# Patient Record
Sex: FEMALE | Race: WHITE | NOT HISPANIC OR LATINO | URBAN - METROPOLITAN AREA
[De-identification: names, ages, dates, MRNs, and addresses within clinical notes are randomized per-mention and may not be internally consistent; named-entity substitution may affect disease eponyms.]

---

## 2017-01-18 ENCOUNTER — EMERGENCY (EMERGENCY)
Facility: HOSPITAL | Age: 77
LOS: 1 days | Discharge: HOME | End: 2017-01-18
Attending: EMERGENCY MEDICINE | Admitting: EMERGENCY MEDICINE
Payer: MEDICARE

## 2017-01-18 VITALS
OXYGEN SATURATION: 99 % | TEMPERATURE: 99 F | SYSTOLIC BLOOD PRESSURE: 178 MMHG | HEART RATE: 68 BPM | RESPIRATION RATE: 18 BRPM | DIASTOLIC BLOOD PRESSURE: 96 MMHG

## 2017-01-18 DIAGNOSIS — S90.32XA CONTUSION OF LEFT FOOT, INITIAL ENCOUNTER: ICD-10-CM

## 2017-01-18 DIAGNOSIS — Z88.1 ALLERGY STATUS TO OTHER ANTIBIOTIC AGENTS STATUS: ICD-10-CM

## 2017-01-18 DIAGNOSIS — M25.572 PAIN IN LEFT ANKLE AND JOINTS OF LEFT FOOT: ICD-10-CM

## 2017-01-18 PROCEDURE — 73610 X-RAY EXAM OF ANKLE: CPT | Mod: 26,LT

## 2017-01-18 PROCEDURE — 73630 X-RAY EXAM OF FOOT: CPT | Mod: 26,LT

## 2017-01-18 PROCEDURE — 73600 X-RAY EXAM OF ANKLE: CPT | Mod: 26,LT

## 2017-01-18 PROCEDURE — 73620 X-RAY EXAM OF FOOT: CPT | Mod: 26,LT

## 2017-01-18 PROCEDURE — 99283 EMERGENCY DEPT VISIT LOW MDM: CPT | Mod: 25

## 2017-01-18 RX ORDER — IBUPROFEN 200 MG
1 TABLET ORAL
Qty: 30 | Refills: 0 | OUTPATIENT
Start: 2017-01-18 | End: 2017-01-28

## 2017-01-18 RX ORDER — IBUPROFEN 200 MG
600 TABLET ORAL ONCE
Qty: 0 | Refills: 0 | Status: COMPLETED | OUTPATIENT
Start: 2017-01-18 | End: 2017-01-18

## 2017-01-18 RX ADMIN — Medication 600 MILLIGRAM(S): at 19:14

## 2017-01-18 RX ADMIN — Medication 600 MILLIGRAM(S): at 19:19

## 2017-01-18 NOTE — ED PROVIDER NOTE - MUSCULOSKELETAL, MLM
Ecchymosis to left midfoot. Left midfoot TTP. Lateral malleolus TTP. Distal pulses intact, sensation intact. Pt is ambulatory. LLE: ecchymosis to left midfoot with midfoot TTP. Lateral malleolus TTP. Distal pulses intact, sensation intact. Pt is ambulatory.

## 2017-01-18 NOTE — ED PROVIDER NOTE - PROGRESS NOTE DETAILS
The scribe's documentation has been prepared under my direction and personally reviewed by me in its entirety. I confirm that the note above accurately reflects all work, treatment, procedures, and medical decision making performed by me.  CONNOR Callahan fb plantar aspect to sole of foot seen on xray - no visible or palpable fb on exam - probable old fb, will rx augmentin prophlaxis antibiotics, f/u podiatry

## 2017-01-18 NOTE — ED PROVIDER NOTE - NS ED MD SCRIBE ATTENDING SCRIBE SECTIONS
HISTORY OF PRESENT ILLNESS/VITAL SIGNS( Pullset)/HIV/INTAKE ASSESSMENT/SCREENINGS/PAST MEDICAL/SURGICAL/SOCIAL HISTORY/REVIEW OF SYSTEMS/PHYSICAL EXAM

## 2017-01-18 NOTE — ED PROVIDER NOTE - DIAGNOSTIC INTERPRETATION
x-ray left ankle, 2 views: no fractures, no dislocations, no subluxation  x-ray left foot AP + lateral + oblique: no fractures, no dislocations, probable old foreign body in soft tissue on sole of foot

## 2017-06-20 ENCOUNTER — EMERGENCY (EMERGENCY)
Facility: HOSPITAL | Age: 77
LOS: 1 days | Discharge: HOME | End: 2017-06-20
Admitting: EMERGENCY MEDICINE
Payer: MEDICARE

## 2017-06-20 VITALS
TEMPERATURE: 99 F | HEART RATE: 70 BPM | DIASTOLIC BLOOD PRESSURE: 84 MMHG | OXYGEN SATURATION: 96 % | RESPIRATION RATE: 20 BRPM | SYSTOLIC BLOOD PRESSURE: 138 MMHG

## 2017-06-20 VITALS — HEART RATE: 77 BPM | SYSTOLIC BLOOD PRESSURE: 141 MMHG | DIASTOLIC BLOOD PRESSURE: 88 MMHG

## 2017-06-20 DIAGNOSIS — Z88.1 ALLERGY STATUS TO OTHER ANTIBIOTIC AGENTS STATUS: ICD-10-CM

## 2017-06-20 DIAGNOSIS — M54.5 LOW BACK PAIN: ICD-10-CM

## 2017-06-20 DIAGNOSIS — S30.0XXA CONTUSION OF LOWER BACK AND PELVIS, INITIAL ENCOUNTER: ICD-10-CM

## 2017-06-20 PROCEDURE — 99284 EMERGENCY DEPT VISIT MOD MDM: CPT

## 2017-06-20 PROCEDURE — 72131 CT LUMBAR SPINE W/O DYE: CPT | Mod: 26

## 2017-06-20 PROCEDURE — 72128 CT CHEST SPINE W/O DYE: CPT | Mod: 26

## 2017-06-20 RX ORDER — OXYCODONE HYDROCHLORIDE 5 MG/1
1 TABLET ORAL
Qty: 8 | Refills: 0 | OUTPATIENT
Start: 2017-06-20 | End: 2017-06-22

## 2017-06-20 NOTE — ED ADULT NURSE NOTE - OBJECTIVE STATEMENT
Patient to ED with complaint of right sided back and leg pain s/p fall.  Patient denies hitting head.  No distress

## 2017-06-20 NOTE — ED PROVIDER NOTE - MEDICAL DECISION MAKING DETAILS
patient PMHx spine surgery with mid and lower back pain after trip and fall today, will do CT give analgesics and reassess

## 2017-06-20 NOTE — ED PROVIDER NOTE - PHYSICAL EXAMINATION
surgical scar over cervical and lumbar spines, TTP over T12-L2, no step off, no ecchymosis, no abrasions, FROM

## 2017-06-20 NOTE — ED PROVIDER NOTE - OBJECTIVE STATEMENT
PMHX cervical and lumbar spine surgery 30 years ago, anxiety presents with R sided mid and lower back pain after tripping and landing on her mid and lower back against the canister of a vaacuum. patient states pain radiated up to R shoulder and lower leg. denies paresthesias focal weakness, able to partial weight bear. denies head injury LOC, neck pain, chest or abdominal pain. patient took a xanax as she was feeling that she was about to have an anxiety attack.

## 2017-12-18 PROBLEM — Z00.00 ENCOUNTER FOR PREVENTIVE HEALTH EXAMINATION: Status: ACTIVE | Noted: 2017-12-18

## 2017-12-19 ENCOUNTER — APPOINTMENT (OUTPATIENT)
Dept: VASCULAR SURGERY | Facility: CLINIC | Age: 77
End: 2017-12-19
Payer: MEDICARE

## 2017-12-19 VITALS
HEART RATE: 70 BPM | HEIGHT: 61 IN | DIASTOLIC BLOOD PRESSURE: 72 MMHG | WEIGHT: 179.13 LBS | OXYGEN SATURATION: 96 % | TEMPERATURE: 98.1 F | SYSTOLIC BLOOD PRESSURE: 138 MMHG | BODY MASS INDEX: 33.82 KG/M2

## 2017-12-19 DIAGNOSIS — Z86.59 PERSONAL HISTORY OF OTHER MENTAL AND BEHAVIORAL DISORDERS: ICD-10-CM

## 2017-12-19 DIAGNOSIS — Z86.69 PERSONAL HISTORY OF OTHER DISEASES OF THE NERVOUS SYSTEM AND SENSE ORGANS: ICD-10-CM

## 2017-12-19 DIAGNOSIS — Z86.39 PERSONAL HISTORY OF OTHER ENDOCRINE, NUTRITIONAL AND METABOLIC DISEASE: ICD-10-CM

## 2017-12-19 DIAGNOSIS — S42.301A UNSPECIFIED FRACTURE OF SHAFT OF HUMERUS, RIGHT ARM, INITIAL ENCOUNTER FOR CLOSED FRACTURE: ICD-10-CM

## 2017-12-19 DIAGNOSIS — Z86.79 PERSONAL HISTORY OF OTHER DISEASES OF THE CIRCULATORY SYSTEM: ICD-10-CM

## 2017-12-19 DIAGNOSIS — R73.03 PREDIABETES.: ICD-10-CM

## 2017-12-19 DIAGNOSIS — Z78.9 OTHER SPECIFIED HEALTH STATUS: ICD-10-CM

## 2017-12-19 DIAGNOSIS — S82.91XA UNSPECIFIED FRACTURE OF RIGHT LOWER LEG, INITIAL ENCOUNTER FOR CLOSED FRACTURE: ICD-10-CM

## 2017-12-19 PROCEDURE — 93970 EXTREMITY STUDY: CPT

## 2017-12-19 PROCEDURE — 99215 OFFICE O/P EST HI 40 MIN: CPT | Mod: 25

## 2017-12-19 RX ORDER — ALPRAZOLAM 1 MG/1
1 TABLET ORAL
Refills: 0 | Status: ACTIVE | COMMUNITY

## 2017-12-19 RX ORDER — LATANOPROST/PF 0.005 %
0.01 DROPS OPHTHALMIC (EYE)
Refills: 0 | Status: ACTIVE | COMMUNITY

## 2017-12-19 RX ORDER — VALSARTAN AND HYDROCHLOROTHIAZIDE 80; 12.5 MG/1; MG/1
80-12.5 TABLET, FILM COATED ORAL
Refills: 0 | Status: ACTIVE | COMMUNITY

## 2017-12-19 RX ORDER — AMLODIPINE BESYLATE 2.5 MG/1
2.5 TABLET ORAL
Refills: 0 | Status: ACTIVE | COMMUNITY

## 2017-12-19 RX ORDER — ATORVASTATIN CALCIUM 40 MG/1
40 TABLET, FILM COATED ORAL
Refills: 0 | Status: ACTIVE | COMMUNITY

## 2017-12-19 RX ORDER — METHYLPHENIDATE HYDROCHLORIDE 10 MG/1
10 TABLET ORAL
Refills: 0 | Status: ACTIVE | COMMUNITY

## 2018-01-11 ENCOUNTER — APPOINTMENT (OUTPATIENT)
Dept: VASCULAR SURGERY | Facility: CLINIC | Age: 78
End: 2018-01-11

## 2018-01-11 ENCOUNTER — APPOINTMENT (OUTPATIENT)
Dept: VASCULAR SURGERY | Facility: CLINIC | Age: 78
End: 2018-01-11
Payer: MEDICARE

## 2018-01-11 VITALS — HEART RATE: 60 BPM | SYSTOLIC BLOOD PRESSURE: 151 MMHG | DIASTOLIC BLOOD PRESSURE: 83 MMHG

## 2018-01-11 VITALS
SYSTOLIC BLOOD PRESSURE: 160 MMHG | HEART RATE: 67 BPM | DIASTOLIC BLOOD PRESSURE: 88 MMHG | OXYGEN SATURATION: 96 % | TEMPERATURE: 98.3 F

## 2018-01-11 PROCEDURE — 36478 ENDOVENOUS LASER 1ST VEIN: CPT | Mod: LT

## 2018-01-18 ENCOUNTER — APPOINTMENT (OUTPATIENT)
Dept: VASCULAR SURGERY | Facility: CLINIC | Age: 78
End: 2018-01-18
Payer: MEDICARE

## 2018-01-18 VITALS
TEMPERATURE: 98.4 F | HEART RATE: 60 BPM | SYSTOLIC BLOOD PRESSURE: 161 MMHG | DIASTOLIC BLOOD PRESSURE: 94 MMHG | OXYGEN SATURATION: 98 %

## 2018-01-18 DIAGNOSIS — M79.605 PAIN IN LEFT LEG: ICD-10-CM

## 2018-01-18 DIAGNOSIS — R25.2 CRAMP AND SPASM: ICD-10-CM

## 2018-01-18 PROCEDURE — 37765 STAB PHLEB VEINS XTR 10-20: CPT | Mod: 52,RT

## 2018-01-20 ENCOUNTER — EMERGENCY (EMERGENCY)
Facility: HOSPITAL | Age: 78
LOS: 1 days | Discharge: HOME | End: 2018-01-20
Attending: EMERGENCY MEDICINE | Admitting: EMERGENCY MEDICINE
Payer: MEDICARE

## 2018-01-20 VITALS
RESPIRATION RATE: 18 BRPM | WEIGHT: 175.05 LBS | DIASTOLIC BLOOD PRESSURE: 91 MMHG | TEMPERATURE: 98 F | OXYGEN SATURATION: 99 % | HEART RATE: 71 BPM | SYSTOLIC BLOOD PRESSURE: 164 MMHG

## 2018-01-20 VITALS
OXYGEN SATURATION: 98 % | DIASTOLIC BLOOD PRESSURE: 61 MMHG | SYSTOLIC BLOOD PRESSURE: 144 MMHG | HEART RATE: 76 BPM | RESPIRATION RATE: 16 BRPM

## 2018-01-20 DIAGNOSIS — M79.89 OTHER SPECIFIED SOFT TISSUE DISORDERS: ICD-10-CM

## 2018-01-20 DIAGNOSIS — Z98.890 OTHER SPECIFIED POSTPROCEDURAL STATES: Chronic | ICD-10-CM

## 2018-01-20 DIAGNOSIS — E78.5 HYPERLIPIDEMIA, UNSPECIFIED: ICD-10-CM

## 2018-01-20 DIAGNOSIS — Z79.891 LONG TERM (CURRENT) USE OF OPIATE ANALGESIC: ICD-10-CM

## 2018-01-20 DIAGNOSIS — Z88.1 ALLERGY STATUS TO OTHER ANTIBIOTIC AGENTS STATUS: ICD-10-CM

## 2018-01-20 DIAGNOSIS — I10 ESSENTIAL (PRIMARY) HYPERTENSION: ICD-10-CM

## 2018-01-20 LAB
ALBUMIN SERPL ELPH-MCNC: 3.5 G/DL — SIGNIFICANT CHANGE UP (ref 3.4–5)
ALP SERPL-CCNC: 114 U/L — SIGNIFICANT CHANGE UP (ref 40–120)
ALT FLD-CCNC: 24 U/L — SIGNIFICANT CHANGE UP (ref 12–42)
ANION GAP SERPL CALC-SCNC: 7 MMOL/L — LOW (ref 9–16)
APTT BLD: 37.5 SEC — HIGH (ref 27.5–36.5)
AST SERPL-CCNC: 19 U/L — SIGNIFICANT CHANGE UP (ref 15–37)
BASOPHILS NFR BLD AUTO: 0.4 % — SIGNIFICANT CHANGE UP (ref 0–2)
BILIRUB SERPL-MCNC: 0.3 MG/DL — SIGNIFICANT CHANGE UP (ref 0.2–1.2)
BUN SERPL-MCNC: 25 MG/DL — HIGH (ref 7–23)
CALCIUM SERPL-MCNC: 9.1 MG/DL — SIGNIFICANT CHANGE UP (ref 8.5–10.5)
CHLORIDE SERPL-SCNC: 107 MMOL/L — SIGNIFICANT CHANGE UP (ref 96–108)
CO2 SERPL-SCNC: 31 MMOL/L — SIGNIFICANT CHANGE UP (ref 22–31)
CREAT SERPL-MCNC: 0.79 MG/DL — SIGNIFICANT CHANGE UP (ref 0.5–1.3)
D DIMER BLD IA.RAPID-MCNC: 279 NG/ML DDU — HIGH
EOSINOPHIL NFR BLD AUTO: 5.7 % — SIGNIFICANT CHANGE UP (ref 0–6)
GLUCOSE SERPL-MCNC: 111 MG/DL — HIGH (ref 70–99)
HCT VFR BLD CALC: 36.3 % — SIGNIFICANT CHANGE UP (ref 34.5–45)
HGB BLD-MCNC: 12 G/DL — SIGNIFICANT CHANGE UP (ref 11.5–15.5)
IMM GRANULOCYTES NFR BLD AUTO: 0.2 % — SIGNIFICANT CHANGE UP (ref 0–1.5)
INR BLD: 1 — SIGNIFICANT CHANGE UP (ref 0.88–1.16)
LYMPHOCYTES # BLD AUTO: 35 % — SIGNIFICANT CHANGE UP (ref 13–44)
MCHC RBC-ENTMCNC: 30.5 PG — SIGNIFICANT CHANGE UP (ref 27–34)
MCHC RBC-ENTMCNC: 33.1 G/DL — SIGNIFICANT CHANGE UP (ref 32–36)
MCV RBC AUTO: 92.1 FL — SIGNIFICANT CHANGE UP (ref 80–100)
MONOCYTES NFR BLD AUTO: 8.5 % — SIGNIFICANT CHANGE UP (ref 2–14)
NEUTROPHILS NFR BLD AUTO: 50.2 % — SIGNIFICANT CHANGE UP (ref 43–77)
PLATELET # BLD AUTO: 204 K/UL — SIGNIFICANT CHANGE UP (ref 150–400)
POTASSIUM SERPL-MCNC: 4.3 MMOL/L — SIGNIFICANT CHANGE UP (ref 3.5–5.3)
POTASSIUM SERPL-SCNC: 4.3 MMOL/L — SIGNIFICANT CHANGE UP (ref 3.5–5.3)
PROT SERPL-MCNC: 7.2 G/DL — SIGNIFICANT CHANGE UP (ref 6.4–8.2)
PROTHROM AB SERPL-ACNC: 11 SEC — SIGNIFICANT CHANGE UP (ref 9.8–12.7)
RBC # BLD: 3.94 M/UL — SIGNIFICANT CHANGE UP (ref 3.8–5.2)
RBC # FLD: 13 % — SIGNIFICANT CHANGE UP (ref 10.3–16.9)
SODIUM SERPL-SCNC: 145 MMOL/L — SIGNIFICANT CHANGE UP (ref 132–145)
WBC # BLD: 5 K/UL — SIGNIFICANT CHANGE UP (ref 3.8–10.5)
WBC # FLD AUTO: 5 K/UL — SIGNIFICANT CHANGE UP (ref 3.8–10.5)

## 2018-01-20 PROCEDURE — 93971 EXTREMITY STUDY: CPT | Mod: 26,RT

## 2018-01-20 PROCEDURE — 99284 EMERGENCY DEPT VISIT MOD MDM: CPT

## 2018-01-20 NOTE — ED PROVIDER NOTE - MEDICAL DECISION MAKING DETAILS
Patient presenting with leg swelling, likely 2/2 ACE wrap. Sent in by her vasc MD for sono. DDimer normal for age and sono neg.

## 2018-01-20 NOTE — ED ADULT NURSE NOTE - OBJECTIVE STATEMENT
Pt arrived c/o lower leg swelling after a stab phebecotomy.  Minimal swelling noted upon arrival. Pt's leg has palpable pulses and skin is warm and pink.

## 2018-01-20 NOTE — ED PROVIDER NOTE - OBJECTIVE STATEMENT
78 yo F w/ history of HTN, HLD, s/p stab phlebectomy of RLE 2 days ago complains of RLE swelling x 1 day. Pt denies SOB, chest pain, RLE pain, cough, redness, bruising, numbness/tingling. Pt with dressing to right calf and has not removed dressing, but has adjusted ace bandage since surgery. Dr. Luna ([phone]) told the pt to come in to the ED for evaluation.

## 2018-01-24 ENCOUNTER — APPOINTMENT (OUTPATIENT)
Dept: VASCULAR SURGERY | Facility: CLINIC | Age: 78
End: 2018-01-24
Payer: MEDICARE

## 2018-01-24 VITALS
TEMPERATURE: 98.4 F | DIASTOLIC BLOOD PRESSURE: 86 MMHG | OXYGEN SATURATION: 97 % | HEART RATE: 68 BPM | SYSTOLIC BLOOD PRESSURE: 128 MMHG

## 2018-01-24 PROCEDURE — 99024 POSTOP FOLLOW-UP VISIT: CPT

## 2018-02-01 ENCOUNTER — APPOINTMENT (OUTPATIENT)
Dept: VASCULAR SURGERY | Facility: CLINIC | Age: 78
End: 2018-02-01

## 2018-02-02 ENCOUNTER — APPOINTMENT (OUTPATIENT)
Dept: VASCULAR SURGERY | Facility: CLINIC | Age: 78
End: 2018-02-02
Payer: MEDICARE

## 2018-02-02 VITALS
DIASTOLIC BLOOD PRESSURE: 91 MMHG | TEMPERATURE: 98 F | HEART RATE: 62 BPM | OXYGEN SATURATION: 96 % | SYSTOLIC BLOOD PRESSURE: 143 MMHG

## 2018-02-02 DIAGNOSIS — I83.893 VARICOSE VEINS OF BILATERAL LOWER EXTREMITIES WITH OTHER COMPLICATIONS: ICD-10-CM

## 2018-02-02 DIAGNOSIS — I83.12 VARICOSE VEINS OF LEFT LOWER EXTREMITY WITH INFLAMMATION: ICD-10-CM

## 2018-02-02 PROCEDURE — 99024 POSTOP FOLLOW-UP VISIT: CPT

## 2018-07-26 PROBLEM — I83.893 VARICOSE VEINS OF BILATERAL LOWER EXTREMITIES WITH OTHER COMPLICATIONS: Status: RESOLVED | Noted: 2017-12-19 | Resolved: 2018-07-26

## 2018-07-26 PROBLEM — Z78.9 ALCOHOL USE: Status: ACTIVE | Noted: 2017-12-19

## 2019-02-25 ENCOUNTER — EMERGENCY (EMERGENCY)
Facility: HOSPITAL | Age: 79
LOS: 1 days | Discharge: HOME | End: 2019-02-25
Admitting: EMERGENCY MEDICINE
Payer: MEDICARE

## 2019-02-25 VITALS
OXYGEN SATURATION: 94 % | HEART RATE: 54 BPM | TEMPERATURE: 98 F | SYSTOLIC BLOOD PRESSURE: 128 MMHG | RESPIRATION RATE: 16 BRPM | DIASTOLIC BLOOD PRESSURE: 69 MMHG

## 2019-02-25 VITALS
SYSTOLIC BLOOD PRESSURE: 119 MMHG | TEMPERATURE: 99 F | DIASTOLIC BLOOD PRESSURE: 80 MMHG | RESPIRATION RATE: 16 BRPM | OXYGEN SATURATION: 95 % | HEART RATE: 80 BPM

## 2019-02-25 DIAGNOSIS — I10 ESSENTIAL (PRIMARY) HYPERTENSION: ICD-10-CM

## 2019-02-25 DIAGNOSIS — E78.5 HYPERLIPIDEMIA, UNSPECIFIED: ICD-10-CM

## 2019-02-25 DIAGNOSIS — Z88.1 ALLERGY STATUS TO OTHER ANTIBIOTIC AGENTS STATUS: ICD-10-CM

## 2019-02-25 DIAGNOSIS — Z91.018 ALLERGY TO OTHER FOODS: ICD-10-CM

## 2019-02-25 DIAGNOSIS — Z98.890 OTHER SPECIFIED POSTPROCEDURAL STATES: Chronic | ICD-10-CM

## 2019-02-25 DIAGNOSIS — Z79.899 OTHER LONG TERM (CURRENT) DRUG THERAPY: ICD-10-CM

## 2019-02-25 DIAGNOSIS — K45.8 OTHER SPECIFIED ABDOMINAL HERNIA WITHOUT OBSTRUCTION OR GANGRENE: ICD-10-CM

## 2019-02-25 DIAGNOSIS — M54.5 LOW BACK PAIN: ICD-10-CM

## 2019-02-25 PROCEDURE — 72131 CT LUMBAR SPINE W/O DYE: CPT | Mod: 26

## 2019-02-25 PROCEDURE — 99284 EMERGENCY DEPT VISIT MOD MDM: CPT

## 2019-02-25 RX ORDER — BACLOFEN 100 %
10 POWDER (GRAM) MISCELLANEOUS ONCE
Qty: 0 | Refills: 0 | Status: DISCONTINUED | OUTPATIENT
Start: 2019-02-25 | End: 2019-02-25

## 2019-02-25 RX ORDER — AMLODIPINE BESYLATE 2.5 MG/1
1 TABLET ORAL
Qty: 0 | Refills: 0 | COMMUNITY

## 2019-02-25 RX ORDER — KETOROLAC TROMETHAMINE 30 MG/ML
60 SYRINGE (ML) INJECTION ONCE
Qty: 0 | Refills: 0 | Status: DISCONTINUED | OUTPATIENT
Start: 2019-02-25 | End: 2019-02-25

## 2019-02-25 RX ORDER — OXYCODONE AND ACETAMINOPHEN 5; 325 MG/1; MG/1
2 TABLET ORAL ONCE
Qty: 0 | Refills: 0 | Status: DISCONTINUED | OUTPATIENT
Start: 2019-02-25 | End: 2019-02-25

## 2019-02-25 RX ORDER — METHOCARBAMOL 500 MG/1
2 TABLET, FILM COATED ORAL
Qty: 40 | Refills: 0
Start: 2019-02-25 | End: 2019-03-01

## 2019-02-25 RX ORDER — OXYCODONE AND ACETAMINOPHEN 5; 325 MG/1; MG/1
1 TABLET ORAL
Qty: 12 | Refills: 0
Start: 2019-02-25 | End: 2019-02-27

## 2019-02-25 RX ORDER — METHOCARBAMOL 500 MG/1
750 TABLET, FILM COATED ORAL ONCE
Qty: 0 | Refills: 0 | Status: COMPLETED | OUTPATIENT
Start: 2019-02-25 | End: 2019-02-25

## 2019-02-25 RX ADMIN — Medication 60 MILLIGRAM(S): at 15:21

## 2019-02-25 RX ADMIN — OXYCODONE AND ACETAMINOPHEN 2 TABLET(S): 5; 325 TABLET ORAL at 18:00

## 2019-02-25 RX ADMIN — METHOCARBAMOL 750 MILLIGRAM(S): 500 TABLET, FILM COATED ORAL at 15:21

## 2019-02-25 NOTE — ED PROVIDER NOTE - OBJECTIVE STATEMENT
PMHx cervical disc hernaition s/p laminectomy, sciatica, migraine h/a on gabapentin and percocet, HTN, hyperlipidemia presents with R lower back pain with radiation to the upper R thigh x 6 weeks after trip and fall down steps hitting her back against a metal handrail. denies head trauma, LOC, dizziness, focal weakness, paresthesias. states that she has been taking percocet on occationn with mild improvement of symptoms. pain worsened on changing positions

## 2019-02-25 NOTE — ED PROVIDER NOTE - CARE PROVIDER_API CALL
Dionicio Harman)  Pain Medicine; PhysicalRehab Medicine  [address] [place] [address]  Phone: [phone]  Fax: [phone]  Follow Up Time:     Jennifer Tijerina)  Anesthesiology; Pain Medicine  [address], [place] [address]  Phone: [phone]  Fax: [phone]  Follow Up Time:

## 2019-02-25 NOTE — ED PROVIDER NOTE - CLINICAL SUMMARY MEDICAL DECISION MAKING FREE TEXT BOX
PMHX cervical disc herniation, presents with worsening back pain after fall 6 week ago. able to weight bear. no saddle paresthesias, no urinary or bowl complaints. CT shows diffused DJD. advised follow up with PM&R

## 2019-10-08 ENCOUNTER — APPOINTMENT (OUTPATIENT)
Dept: VASCULAR SURGERY | Facility: CLINIC | Age: 79
End: 2019-10-08
Payer: MEDICARE

## 2019-10-08 VITALS
DIASTOLIC BLOOD PRESSURE: 83 MMHG | SYSTOLIC BLOOD PRESSURE: 166 MMHG | OXYGEN SATURATION: 96 % | HEART RATE: 70 BPM | TEMPERATURE: 98.7 F

## 2019-10-08 DIAGNOSIS — I83.10 VARICOSE VEINS OF UNSPECIFIED LOWER EXTREMITY WITH INFLAMMATION: ICD-10-CM

## 2019-10-08 PROCEDURE — 99214 OFFICE O/P EST MOD 30 MIN: CPT | Mod: 25

## 2019-10-08 PROCEDURE — 93970 EXTREMITY STUDY: CPT

## 2019-10-08 RX ORDER — ARIPIPRAZOLE 2 MG/1
2 TABLET ORAL
Refills: 0 | Status: ACTIVE | COMMUNITY

## 2019-10-08 RX ORDER — TIMOLOL MALEATE 5 MG/ML
SOLUTION/ DROPS OPHTHALMIC
Refills: 0 | Status: DISCONTINUED | COMMUNITY
End: 2019-10-08

## 2019-10-08 RX ORDER — ERENUMAB-AOOE 140 MG/ML
INJECTION, SOLUTION SUBCUTANEOUS
Refills: 0 | Status: ACTIVE | COMMUNITY

## 2019-10-08 RX ORDER — BIMATOPROST 0.1 MG/ML
SOLUTION/ DROPS OPHTHALMIC
Refills: 0 | Status: ACTIVE | COMMUNITY

## 2019-10-08 RX ORDER — GABAPENTIN 300 MG/1
300 CAPSULE ORAL
Refills: 0 | Status: DISCONTINUED | COMMUNITY
End: 2019-10-08

## 2019-10-08 NOTE — PHYSICAL EXAM
[JVD] : no jugular venous distention  [2+] : left 2+ [Ankle Swelling (On Exam)] : present [Ankle Swelling On The Left] : moderate [Varicose Veins Of Lower Extremities] : present [Ankle Swelling Bilaterally] : severe [Varicose Veins Of The Right Leg] : of the right leg [] : bilaterally [No Rash or Lesion] : No rash or lesion [Ankle Swelling On The Right] : mild [Petechiae] : no petechiae [Purpura] : no purpura  [Skin Ulcer] : no ulcer [Skin Induration] : no induration [Alert] : alert [Oriented to Person] : oriented to person [Oriented to Place] : oriented to place [Oriented to Time] : oriented to time [Calm] : calm [de-identified] : wdwn nad [de-identified] : wnl [de-identified] : maryannel [de-identified] : wnl [FreeTextEntry1] : rt calf large dilated branch veins [de-identified] : as above

## 2019-10-08 NOTE — REASON FOR VISIT
[Follow-Up: _____] : a [unfilled] follow-up visit [FreeTextEntry1] : She is s/p left gsv evlt on 1/11/18, right posterior calf microphlebectomy on 1/18/19 and at [facility], she had right evlt, right microphlebectomy and sclerotherapy.  She is here today with c/o right leg, ankle swelling and pain x 1 month.  She has severe dilated branch varicose veins right medial thigh and right calf.  Ms. Ellis denies c/o discomfort left leg.  VLE today confirms no DVT and no SVT bilateral leg, + deep reflux bilateral leg, + reflux left lsv, dilated branch varicose veins right calf, and bilateral GSV and right LSV not visualized consistent with prior laser ablation procedures.  Recommend thigh high medical grade 20-30 mmHg compression stockings to be worn daily and during flights.  Provided patient with prescription.  She would benefit from right leg microphlebectomy and will schedule right medial thigh and calf microphlebectomy.  If she begins to develop venous symptoms left leg, she may benefit from left lsv evlt.

## 2019-10-08 NOTE — ASSESSMENT
[Other: _____] : [unfilled] [FreeTextEntry1] : Right  leg stab phlebectomy (38681)\par \par Varicose vein of right  lower extremities with inflammation (I83.11);  Varicose veins with complication (I83.893); Venous/reflux insufficiency(I87.2); Leg pain (right, left) (M79.604, M79.605); Swelling of limb (M79.89); Cramps of limb (R25.2); Chronic venous hypertension with other complication (I87.399)\par \par Ms. PAN DOWNEY is a 79 year year old F with complaints of large painful right   leg branch varicose veins.  The patient has tried medical grade support hose 20-30 mmHg, periodic elevation, exercise, avoidance of prolonged immobility and pain medication for several months without success. She is s/p right gsv and right lsv evlt.  The patient has persistent symptoms of leg pain, aches, cramps, burning, itching, restlessness, especially with prolonged standing and sitting - symptoms interfere with the ability to function well throughout the day.  Detailed bilateral leg venous ultrasound reveals no truncal vein reflux.  However, there is evidence of rihgt leg enlarged branch varicose veins.  The patient requires stab phlebectomy in the doctor’s office to remove the large painful varicose vein branches from the patient’s right  leg. \par Reviewed with the patient that microphlebectomy procedure is a method of removing varicose veins on the surface of the legs.  It is done in the office under local anesthesia.  This procedure involves making tiny incisions through which the varicose veins are removed.  There is rarely pain, but an occasional sensation of "tugging and pulling," should be expected.  After treatment, a compression bandage is worn for 72 hours and the treated leg must remain completely dry.  Temporary bruising and swelling of the treated area is typical and is minimized with compression bandage.  The patient can walk immediately after surgery and carry on normal daily activities except for strenuous exercise and heavy lifting.  The patient must follow the activity restrictions and wear the compression bandage as directed.\par \par

## 2019-10-08 NOTE — DATA REVIEWED
[FreeTextEntry1] : b/l vle performed by RVT Penalo - b/l popliteal reflux, left lsv reflux, rt calf branch veins; b/l gsv closed and rt lsv closed c/w prior EVLT procedures

## 2019-10-08 NOTE — ADDENDUM
[FreeTextEntry1] : Provided patient and significant other with copy of post microphlebectomy instructions:\par \par Post-procedure, microphlebectomy, instructions:\par You will leave the office with the operated leg wrapped with an outer elastic ace wrap and an inner white gauze wrap.  The operated leg must remain absolutely dry for 72 hours from the time of your surgical procedure.  After 72 hours, you may remove the outer elastic ace wrap and an inner white gauze wrap.  Leave the white steri-strip tapes in place that are covering each individual sutured incision.  They may fall off on their own over the course of the next two weeks but do not remove them yourself.  It is important for you to walk and resume normal daily and household activities right away.  However, you should refrain from heavy exercise for at least 3 days and longer if still uncomfortable.  After the outer wraps are removed on day number 3, you may get the legs wet in the shower with the steri-strips in place.  Please gently pat dry the leg as opposed to rubbing it dry to avoid disrupting the newly healing incisions.\par You may require some extra strength Tylenol or Motrin/Ibuprofen for any mild discomfort.  Mild to moderate aching is normal, as is bruising.  Severe pain, fever, redness, heat, drainage from the incision or any other sign of infection is not normal.  Call the doctor’s office immediately if you have any of these symptoms.\par You can resume your normal diet, routine and other medications as soon as you return home.  Follow up visit with Dr. Luna in 2 weeks for evaluation and suture removal.\par \par

## 2019-10-15 ENCOUNTER — APPOINTMENT (OUTPATIENT)
Dept: VASCULAR SURGERY | Facility: CLINIC | Age: 79
End: 2019-10-15
Payer: MEDICARE

## 2019-10-15 VITALS
TEMPERATURE: 100 F | DIASTOLIC BLOOD PRESSURE: 84 MMHG | OXYGEN SATURATION: 95 % | HEART RATE: 72 BPM | SYSTOLIC BLOOD PRESSURE: 153 MMHG

## 2019-10-15 DIAGNOSIS — I83.11 VARICOSE VEINS OF RIGHT LOWER EXTREMITY WITH INFLAMMATION: ICD-10-CM

## 2019-10-15 DIAGNOSIS — I83.891 VARICOSE VEINS OF RIGHT LOWER EXTREMITY WITH OTHER COMPLICATIONS: ICD-10-CM

## 2019-10-15 DIAGNOSIS — I83.811 VARICOSE VEINS OF RIGHT LOWER EXTREMITY WITH PAIN: ICD-10-CM

## 2019-10-15 PROCEDURE — 37765 STAB PHLEB VEINS XTR 10-20: CPT | Mod: RT

## 2019-10-15 NOTE — PROCEDURE
[FreeTextEntry1] : Right  lower extremity varicose veins with pain, swelling, and venous insufficiency. [FreeTextEntry3] : Ms. PAN DOWNEY is a 79 year year old F with a history of  right lower extremity varicose veins previously seen in the office.  Ultrasound examination demonstrated multiple varicosities in the patient’s  right  leg with venous reflux.  A trial of compression stockings, exercise, elevation, and pain medication was attempted without relief and as such, this patient was offered microphlebectomy therapy.  After explaining risks and benefits, informed consent was obtained and the patient agreed to proceed.  \par \par The patient was escorted to the procedure room.  The varicose veins for treatment were marked out and the patient agreed with the markings.  Patient was placed in the supine position on the examination table.  The  right  leg was prepped and draped in the usual sterile fashion and time-out was called.  \par \par Local anesthesia was administered subcutaneously along the marked varicosities for treatment.  Anesthesia:  Local anesthesia.  Anesthesia liquid is mixed:  using 20 mL sterile syringe and 18G blunt fill needle with 5 micron filter, 10 mL 0.9% Sodium Chloride for injection and 10 mL sterile injectable lidocaine 2% (without Epinephrine) is drawn up.  18G blunt fill needle with 5 micron filter is removed and 25G 11/2 inch needle placed on syringe with medication for injection.\par \par  A total of  5 micro incisions were made over the varicosities that were previously marked out and the veins were grasped using a combination of mosquitoes and forceps.  The veins were removed.  At completion, hemostasis was ensured with digital pressure at the stab sites.  Once complete, the incisions were appropriately closed with 4-0 Monocryl suture and steri-strips.  The  right leg was appropriately dressed with 4 inch Kerlix and 4 inch latex-free Ace bandage.  The patient tolerated the procedure well with no complications.  Vital signs stable, patient was monitored throughout.  Patient was escorted to the changing room.  Specimen sent to pathology for evaluation.\par \par Post-Op Instructions:  The following instructions were reviewed with the patient and a hard copy of the instructions provided:   1)  Surgical dressing to remain on for 72 hours.   2)  Do not get the surgical dressing wet for the first 3 days.  3)  Ambulation immediately following procedure and as much as possible.  4)  Contact the office if any questions or concerns; follow-up 2 weeks post-op for evaluation and suture removal.\par  [FreeTextEntry2] : \par  \par Varicose veins of the  right  lower extremity with inflammation.  Venous reflux/insufficiency, leg pain, limb swelling and cramps.  Chronic venous hypertension.

## 2019-10-15 NOTE — REASON FOR VISIT
[Procedure: _________] : a [unfilled] procedure visit [FreeTextEntry1] : Patient with painful dilated branch varicose veins right leg.  She is here for right leg microphlebectomy.  PAN DOWNEY proceeded with right leg microphlebectomy without complications.  She will follow up in 2 weeks for routine visit and suture removal.

## 2019-10-15 NOTE — ADDENDUM
[FreeTextEntry1] : RN reviewed with patient post-procedure, microphlebectomy, instructions:\par You will leave the office with the operated leg wrapped with an outer elastic ace wrap and an inner white gauze wrap.  The operated leg must remain absolutely dry for 72 hours from the time of your surgical procedure.  After 72 hours, you may remove the outer elastic ace wrap and an inner white gauze wrap.  Leave the white steri-strip tapes in place that are covering each individual sutured incision.  They may fall off on their own over the course of the next two weeks but do not remove them yourself.  It is important for you to walk and resume normal daily and household activities right away.  However, you should refrain from heavy exercise for at least 3 days and longer if still uncomfortable.  After the outer wraps are removed on day number 3, you may get the legs wet in the shower with the steri-strips in place.  Please gently pat dry the leg as opposed to rubbing it dry to avoid disrupting the newly healing incisions.\par You may require some extra strength Tylenol or Motrin/Ibuprofen for any mild discomfort.  Mild to moderate aching is normal, as is bruising.  Severe pain, fever, redness, heat, drainage from the incision or any other sign of infection is not normal.  Call the doctor’s office immediately if you have any of these symptoms.\par You can resume your normal diet, routine and other medications as soon as you return home.  Follow up visit with Dr. Luna in 2 weeks for evaluation and suture removal.\par \par

## 2019-10-25 ENCOUNTER — APPOINTMENT (OUTPATIENT)
Dept: VASCULAR SURGERY | Facility: CLINIC | Age: 79
End: 2019-10-25
Payer: MEDICARE

## 2019-10-25 VITALS — TEMPERATURE: 99.6 F

## 2019-10-25 VITALS — SYSTOLIC BLOOD PRESSURE: 145 MMHG | HEART RATE: 62 BPM | DIASTOLIC BLOOD PRESSURE: 83 MMHG

## 2019-10-25 PROCEDURE — 99024 POSTOP FOLLOW-UP VISIT: CPT

## 2019-10-29 NOTE — ASSESSMENT
[Other: _____] : [unfilled] [FreeTextEntry1] : Injection sclerotherapy bilateral lower extremities \par \par Doctor’s office procedure\par \par Ms. PAN DOWNEY has been seen by me for vascular consultation and evaluation of painful small branch varicose veins.  This patient has attempted conservative measures with support hose and leg elevation and has had no satisfactory results.  The patient has intact pulses with no evidence of arterial disease.  Patient is complaining of pain, swelling, itching, stabbing, burning, tenderness and cramping which is interfering with daily duties and activities.  \par \par Reviewed with patient that sclerotherapy is the injection of a sterile agent (polidocanol) into the small branch varicose veins.  It works by damaging the inner wall of the vein.  Eventually, the vein collapses on itself and over time, the damaged vein is replaced with fibrous connective tissue and prevents blood flow through the vessel. Sclerotherapy does not prevent the development of new veins.  Before the treatment, photos may be obtained.   \par \par Dr. Luna explained the risks and benefits of sclerotherapy.  Patient should not receive polidocanol if they have a known clotting disorder, have a known allergy to polidocanol, are pregnant or nursing.  Individual results may vary, and the patient may require multiple injection sessions for optimal treatment success.  Each session consists of total dose of 4 mL, 0.5% sclerosing agent (polidocanol) injected, using a very fine needle (30 gauge), into the veins of the treated leg.  Slight stinging may be experienced with each injection.  The skin may look irritated after the treatment and/or bruising may be noted at injection sites.  Irritation and bruising will resolve with time.   One leg is treated at a time and if the patient needs to return for multiple sessions, the patient may return every 2 weeks for additional sessions.  Patient instructed to wear compression stocking continuously for 72 hours following the treatment and may go about their normal activities after the session.  \par \par

## 2019-10-29 NOTE — REASON FOR VISIT
[Follow-Up: _____] : a [unfilled] follow-up visit [Spouse] : spouse [Other: _____] : [unfilled] [FreeTextEntry1] : status post right posterior calf microphlebectomy on 10/15/19.  Patient is here for routine 2 week post operative follow-up and suture removal.    Sutures removed, incisions healing well without signs or symptoms of infection.  She has mild resolving ecchymosis noted; reviewed with patient the bruising is normal and will resolve over time.  Steri-strips placed and reviewed with patient the steri-strips will fall off on their own.  Instructed patient to protect incisions from the sun with use of sunscreen.  She has dilated superficial branch varicose veins bilateral legs and would like to proceed with sclerotherapy treatment.  Ms. Ellis will schedule sclerotherapy visit.  Also, patient interested in aesthetic consultation and will call the office when she knows her schedule.  \par

## 2021-02-08 ENCOUNTER — EMERGENCY (EMERGENCY)
Facility: HOSPITAL | Age: 81
LOS: 1 days | Discharge: HOME | End: 2021-02-08
Attending: EMERGENCY MEDICINE | Admitting: EMERGENCY MEDICINE
Payer: MEDICARE

## 2021-02-08 VITALS
HEART RATE: 64 BPM | OXYGEN SATURATION: 97 % | SYSTOLIC BLOOD PRESSURE: 142 MMHG | RESPIRATION RATE: 18 BRPM | TEMPERATURE: 98 F | DIASTOLIC BLOOD PRESSURE: 69 MMHG

## 2021-02-08 VITALS
RESPIRATION RATE: 18 BRPM | DIASTOLIC BLOOD PRESSURE: 67 MMHG | SYSTOLIC BLOOD PRESSURE: 138 MMHG | OXYGEN SATURATION: 91 % | TEMPERATURE: 99 F | HEART RATE: 69 BPM

## 2021-02-08 DIAGNOSIS — Z98.890 OTHER SPECIFIED POSTPROCEDURAL STATES: Chronic | ICD-10-CM

## 2021-02-08 LAB
ALBUMIN SERPL ELPH-MCNC: 3.3 G/DL — LOW (ref 3.4–5)
ALBUMIN SERPL ELPH-MCNC: 3.8 G/DL — SIGNIFICANT CHANGE UP (ref 3.4–5)
ALP SERPL-CCNC: 104 U/L — SIGNIFICANT CHANGE UP (ref 40–120)
ALP SERPL-CCNC: 118 U/L — SIGNIFICANT CHANGE UP (ref 40–120)
ALT FLD-CCNC: 19 U/L — SIGNIFICANT CHANGE UP (ref 12–42)
ALT FLD-CCNC: 21 U/L — SIGNIFICANT CHANGE UP (ref 12–42)
ANION GAP SERPL CALC-SCNC: 11 MMOL/L — SIGNIFICANT CHANGE UP (ref 9–16)
ANION GAP SERPL CALC-SCNC: 7 MMOL/L — LOW (ref 9–16)
APPEARANCE UR: CLEAR — SIGNIFICANT CHANGE UP
AST SERPL-CCNC: 19 U/L — SIGNIFICANT CHANGE UP (ref 15–37)
AST SERPL-CCNC: 23 U/L — SIGNIFICANT CHANGE UP (ref 15–37)
BACTERIA # UR AUTO: ABNORMAL /HPF
BASOPHILS # BLD AUTO: 0.05 K/UL — SIGNIFICANT CHANGE UP (ref 0–0.2)
BASOPHILS NFR BLD AUTO: 0.7 % — SIGNIFICANT CHANGE UP (ref 0–2)
BILIRUB SERPL-MCNC: 0.3 MG/DL — SIGNIFICANT CHANGE UP (ref 0.2–1.2)
BILIRUB SERPL-MCNC: 0.3 MG/DL — SIGNIFICANT CHANGE UP (ref 0.2–1.2)
BILIRUB UR-MCNC: NEGATIVE — SIGNIFICANT CHANGE UP
BUN SERPL-MCNC: 30 MG/DL — HIGH (ref 7–23)
BUN SERPL-MCNC: 36 MG/DL — HIGH (ref 7–23)
CALCIUM SERPL-MCNC: 8.3 MG/DL — LOW (ref 8.5–10.5)
CALCIUM SERPL-MCNC: 9.3 MG/DL — SIGNIFICANT CHANGE UP (ref 8.5–10.5)
CHLORIDE SERPL-SCNC: 102 MMOL/L — SIGNIFICANT CHANGE UP (ref 96–108)
CHLORIDE SERPL-SCNC: 108 MMOL/L — SIGNIFICANT CHANGE UP (ref 96–108)
CO2 SERPL-SCNC: 26 MMOL/L — SIGNIFICANT CHANGE UP (ref 22–31)
CO2 SERPL-SCNC: 27 MMOL/L — SIGNIFICANT CHANGE UP (ref 22–31)
COLOR SPEC: YELLOW — SIGNIFICANT CHANGE UP
COMMENT - URINE: SIGNIFICANT CHANGE UP
CREAT SERPL-MCNC: 0.99 MG/DL — SIGNIFICANT CHANGE UP (ref 0.5–1.3)
CREAT SERPL-MCNC: 1.07 MG/DL — SIGNIFICANT CHANGE UP (ref 0.5–1.3)
DIFF PNL FLD: NEGATIVE — SIGNIFICANT CHANGE UP
EOSINOPHIL # BLD AUTO: 0.08 K/UL — SIGNIFICANT CHANGE UP (ref 0–0.5)
EOSINOPHIL NFR BLD AUTO: 1.1 % — SIGNIFICANT CHANGE UP (ref 0–6)
EPI CELLS # UR: ABNORMAL /HPF (ref 0–5)
GLUCOSE SERPL-MCNC: 102 MG/DL — HIGH (ref 70–99)
GLUCOSE SERPL-MCNC: 121 MG/DL — HIGH (ref 70–99)
GLUCOSE UR QL: NEGATIVE — SIGNIFICANT CHANGE UP
HCT VFR BLD CALC: 42.1 % — SIGNIFICANT CHANGE UP (ref 34.5–45)
HGB BLD-MCNC: 14.1 G/DL — SIGNIFICANT CHANGE UP (ref 11.5–15.5)
HYALINE CASTS # UR AUTO: ABNORMAL /LPF (ref 0–2)
IMM GRANULOCYTES NFR BLD AUTO: 0.3 % — SIGNIFICANT CHANGE UP (ref 0–1.5)
KETONES UR-MCNC: NEGATIVE — SIGNIFICANT CHANGE UP
LEUKOCYTE ESTERASE UR-ACNC: ABNORMAL
LYMPHOCYTES # BLD AUTO: 2.32 K/UL — SIGNIFICANT CHANGE UP (ref 1–3.3)
LYMPHOCYTES # BLD AUTO: 31.2 % — SIGNIFICANT CHANGE UP (ref 13–44)
MAGNESIUM SERPL-MCNC: 1.9 MG/DL — SIGNIFICANT CHANGE UP (ref 1.6–2.6)
MCHC RBC-ENTMCNC: 29.7 PG — SIGNIFICANT CHANGE UP (ref 27–34)
MCHC RBC-ENTMCNC: 33.5 GM/DL — SIGNIFICANT CHANGE UP (ref 32–36)
MCV RBC AUTO: 88.8 FL — SIGNIFICANT CHANGE UP (ref 80–100)
MONOCYTES # BLD AUTO: 0.6 K/UL — SIGNIFICANT CHANGE UP (ref 0–0.9)
MONOCYTES NFR BLD AUTO: 8.1 % — SIGNIFICANT CHANGE UP (ref 2–14)
NEUTROPHILS # BLD AUTO: 4.36 K/UL — SIGNIFICANT CHANGE UP (ref 1.8–7.4)
NEUTROPHILS NFR BLD AUTO: 58.6 % — SIGNIFICANT CHANGE UP (ref 43–77)
NITRITE UR-MCNC: POSITIVE
NRBC # BLD: 0 /100 WBCS — SIGNIFICANT CHANGE UP (ref 0–0)
PH UR: 6 — SIGNIFICANT CHANGE UP (ref 5–8)
PLATELET # BLD AUTO: 188 K/UL — SIGNIFICANT CHANGE UP (ref 150–400)
POTASSIUM SERPL-MCNC: 2.8 MMOL/L — CRITICAL LOW (ref 3.5–5.3)
POTASSIUM SERPL-MCNC: 3.9 MMOL/L — SIGNIFICANT CHANGE UP (ref 3.5–5.3)
POTASSIUM SERPL-SCNC: 2.8 MMOL/L — CRITICAL LOW (ref 3.5–5.3)
POTASSIUM SERPL-SCNC: 3.9 MMOL/L — SIGNIFICANT CHANGE UP (ref 3.5–5.3)
PROT SERPL-MCNC: 6.7 G/DL — SIGNIFICANT CHANGE UP (ref 6.4–8.2)
PROT SERPL-MCNC: 7.6 G/DL — SIGNIFICANT CHANGE UP (ref 6.4–8.2)
PROT UR-MCNC: NEGATIVE MG/DL — SIGNIFICANT CHANGE UP
RBC # BLD: 4.74 M/UL — SIGNIFICANT CHANGE UP (ref 3.8–5.2)
RBC # FLD: 13.2 % — SIGNIFICANT CHANGE UP (ref 10.3–14.5)
RBC CASTS # UR COMP ASSIST: < 5 /HPF — SIGNIFICANT CHANGE UP
SODIUM SERPL-SCNC: 139 MMOL/L — SIGNIFICANT CHANGE UP (ref 132–145)
SODIUM SERPL-SCNC: 142 MMOL/L — SIGNIFICANT CHANGE UP (ref 132–145)
SP GR SPEC: 1.02 — SIGNIFICANT CHANGE UP (ref 1–1.03)
TROPONIN I SERPL-MCNC: <0.017 NG/ML — LOW (ref 0.02–0.06)
TROPONIN I SERPL-MCNC: <0.017 NG/ML — LOW (ref 0.02–0.06)
UROBILINOGEN FLD QL: 0.2 E.U./DL — SIGNIFICANT CHANGE UP
WBC # BLD: 7.43 K/UL — SIGNIFICANT CHANGE UP (ref 3.8–10.5)
WBC # FLD AUTO: 7.43 K/UL — SIGNIFICANT CHANGE UP (ref 3.8–10.5)
WBC UR QL: > 10 /HPF

## 2021-02-08 PROCEDURE — 99285 EMERGENCY DEPT VISIT HI MDM: CPT

## 2021-02-08 PROCEDURE — 71045 X-RAY EXAM CHEST 1 VIEW: CPT | Mod: 26

## 2021-02-08 PROCEDURE — 93010 ELECTROCARDIOGRAM REPORT: CPT

## 2021-02-08 RX ORDER — SODIUM CHLORIDE 9 MG/ML
1000 INJECTION INTRAMUSCULAR; INTRAVENOUS; SUBCUTANEOUS ONCE
Refills: 0 | Status: COMPLETED | OUTPATIENT
Start: 2021-02-08 | End: 2021-02-08

## 2021-02-08 RX ORDER — POTASSIUM CHLORIDE 20 MEQ
80 PACKET (EA) ORAL ONCE
Refills: 0 | Status: DISCONTINUED | OUTPATIENT
Start: 2021-02-08 | End: 2021-02-08

## 2021-02-08 RX ORDER — POTASSIUM CHLORIDE 20 MEQ
40 PACKET (EA) ORAL
Refills: 0 | Status: COMPLETED | OUTPATIENT
Start: 2021-02-08 | End: 2021-02-08

## 2021-02-08 RX ADMIN — Medication 40 MILLIEQUIVALENT(S): at 16:44

## 2021-02-08 RX ADMIN — SODIUM CHLORIDE 2000 MILLILITER(S): 9 INJECTION INTRAMUSCULAR; INTRAVENOUS; SUBCUTANEOUS at 15:08

## 2021-02-08 RX ADMIN — Medication 40 MILLIEQUIVALENT(S): at 18:48

## 2021-02-08 RX ADMIN — SODIUM CHLORIDE 1000 MILLILITER(S): 9 INJECTION INTRAMUSCULAR; INTRAVENOUS; SUBCUTANEOUS at 16:44

## 2021-02-08 NOTE — ED PROVIDER NOTE - PATIENT PORTAL LINK FT
You can access the FollowMyHealth Patient Portal offered by [facility] by registering at the following website: [email or web address]. By joining [facility]’s FollowMyHealth portal, you will also be able to view your health information using other applications (apps) compatible with our system.

## 2021-02-08 NOTE — ED ADULT NURSE NOTE - OBJECTIVE STATEMENT
Walk in with c/o CP dizziness and persistent dry mouth after receiving COVID vaccine. No SOB, afebrile

## 2021-02-08 NOTE — ED ADULT NURSE NOTE - NSIMPLEMENTINTERV_GEN_ALL_ED
Implemented All Universal Safety Interventions:  [place] to call system. Call bell, personal items and telephone within reach. Instruct patient to call for assistance. Room bathroom lighting operational. Non-slip footwear when patient is off stretcher. Physically safe environment: no spills, clutter or unnecessary equipment. Stretcher in lowest position, wheels locked, appropriate side rails in place.

## 2021-02-08 NOTE — ED PROVIDER NOTE - OBJECTIVE STATEMENT
81 y/o F with PMHx of hypertension presents to the ED for multiple medical complaints. Pt endorses daily use of Adderall 5mg (3 times a day) balanced with Xanax for sleep and anxiety. She presents to the ED complaining of headaches, dry mouth, and dizziness that has been on and off for many months. Over the last few days, Pt was experiencing chills, night sweats, and non-exertional chest pain. Her PCP is Dr. Bejarano of [facility]. Pt received her 1st dose of the COVID-19 vaccine about 5 days ago.

## 2021-02-08 NOTE — ED PROVIDER NOTE - PROGRESS NOTE DETAILS
Observed in the ED without incident. Low potassium repleted with improvement in the CMP.  Given IV hydration for pre-renal azotemia.  Trop negative x 2.  Chest pain free.  EKG non ischemic.  A lot of her more subacute symptoms are likely due to ritalin overuse.  Discussed with patient and spouse in detail.  Will address with her psychiatrist.  Also connected with her PMD Dr. Bejarano who will follow up with her this week. Observed in the ED without incident. Low potassium repleted with improvement in the CMP.  Given IV hydration for pre-renal azotemia.  Trop negative x 2.  Chest pain free.  EKG non ischemic.  A lot of her more subacute symptoms are likely due to ritalin overuse.  Discussed with patient and spouse in detail.  Will address with her psychiatrist.  Also connected with her PMD Dr. Bejarano who will follow up with her this week.  Also reviewed home medication, HCTZ could be contributing to her hypokalemia.  Encouraged dietary supplementation.  Conservative management discussed with the patient in detail.  Close PMD follow up encouraged.  Strict ED return instructions discussed in detail and patient given the opportunity to ask any questions about their discharge diagnosis and instructions

## 2021-02-08 NOTE — ED ADULT NURSE NOTE - CADM POA URETHRAL CATHETER
ID CONSULT  REQUESTING MD: DR DUNCAN  REASON: CELLULITIS.     History of Present Illness:         44 year old y.o. male with history of anxiety, chronic low back pain, depression, diabetes mellitus, chronic leg infection   Admitted yesterday with one day history of a lump on his R groin.   Edema, erythema, opened up. There was some drainage then.     He had some chills also. Lesion on his groin burns.   Urine in the catheter is darker and smells.      Medical/Surgical History :  History - past medical        Past Medical History:   Diagnosis Date   • Anxiety     • Chronic headaches     • Chronic low back pain     • Chronic venous insufficiency     • Depression     • Diabetes mellitus (CMS/[facility])     • Diabetic nephropathy (CMS/[facility])     • Esophageal reflux     • Fracture     • History of hyperbaric oxygen therapy [id number]   • Hyperlipidemia     • Hypertension     • Hypothyroidism     • Morbid obesity (CMS/[facility])     • MRSA (methicillin resistant Staphylococcus aureus) infection 8/26/2010     right foot ulceration requiring debridement and dermagraft implantation   • Multiple sclerosis (CMS/[facility])       DX 2006   • Neurogenic bladder     • Other chronic pain       spine, lower lumbar area   • Personal history of traumatic fracture       right knee 20 years ago   • Pneumonia     • RAD (reactive airway disease)     • Seizure disorder (CMS/[facility])       episode of LOC for 30 min, possible siezure, on keppra   • Urinary incontinence       straight caths TID   • Urinary tract infection     • Wound healing, delayed [id number]         History - past surgical         Past Surgical History:   Procedure Laterality Date   • Abcess drainage   2006     right inner thigh   • Cystourethroscopy   09/20/2018     Dr Edson Matta   • Debridement subcutaneous dermis or epi ea addl 20 sq cm         right foot-MRSA   • Foot amputation through metatarsal Left 11/2017   • Suprapubic tube placement       • Toe amputation Right 3/6/218     2nd toe,  partial amputation   • Wound debridement         right thigh      Family History:  History - family          Family History   Problem Relation Age of Onset   • Hypertension Father     • Cancer, Lung Mother     • Cancer Mother     • Eczema Sister             Social History   He lives with his sister and family.         Socioeconomic History   • Marital status:        Spouse name: Not on file   • Number of children: Not on file   • Years of education: Not on file   • Highest education level: Not on file   Social Needs   • Financial resource strain: Not on file   • Food insecurity - worry: Not on file   • Food insecurity - inability: Not on file   • Transportation needs - medical: Not on file   • Transportation needs - non-medical: Not on file   Occupational History   • Not on file   Tobacco Use   • Smoking status: Current Every Day Smoker       Packs/day: 0.25       Years: 25.00       Pack years: 6.25       Types: Cigarettes   • Smokeless tobacco: Former User   • Tobacco comment: uses e-cig once in a while   Substance and Sexual Activity   • Alcohol use: No       Alcohol/week: 0.0 oz   • Drug use: No   • Sexual activity: No   Other Topics Concern   •  Service Not Asked   • Blood Transfusions Not Asked   • Caffeine Concern Not Asked   • Occupational Exposure Not Asked   • Hobby Hazards Not Asked   • Sleep Concern Not Asked   • Stress Concern Not Asked   • Weight Concern Not Asked   • Special Diet Not Asked   • Back Care Not Asked   • Exercise Not Asked   • Bike Helmet Not Asked   • Seat Belt Yes   • Self-Exams Not Asked   Social History Narrative   • Not on file          Allergies:          ALLERGIES:   Allergen Reactions   • Latex Other (See Comments)       Not food related per pt   • Viscopaste [Gelpad Dressing] RASH and PRURITUS       viscopaste wrap   • Oxybutynin        ANTIBIOTICS:     Cefepime  Vancomycin     Review of systems:    As per HPI. 12 systems evaluated.      Physical Exam:  Visit  Vitals  /84 (BP Location: [facility], Patient Position: Semi-Myers's)   Pulse 94   Temp 97.5 °F (36.4 °C) (Oral)   Resp 18   Ht 6' 1\" (1.854 m)   Wt 129.6 kg   SpO2 97%   BMI 37.70 kg/m²     Tmax: afebrile.     Constitutional:  AOX3. NAD  Eyes:    PERRLA, eye lids clear, and sclera white  Ears, nose, mouth, and throat:   Ears:  external ears normal  Oropharynx: lips, mucosa, and tongue normal., has no thrush.   Cardiovascular:   Heart: regular rate and rhythm, S1, S2, no murmurs/rubs/gallops, PMI midline  Peripheral vascular: bilateral carotid, radial, femoral, DP and PT pulses are normal.  Respiratory:    chest symmetric, lungs clear bilaterally and no crackles, wheezes or rales  Gastrointestinal:  Abdominal:   normal active bowel sounds, no hepatosplenomegaly, no tenderness, no bruits and no pulsatile mass.  Musculoskeletal:  no cyanosis, clubbing or edema  Skin:  normal skin color, texture, and turgor.  No rashes or lesions.  His R groin area has some erythema. Non tender to touch. Very foul smelling discharge.   Urine on SPC is now little cloudy.   Neurological:    Baseline.   Heme/lymph/immunologic:    no enlargement of cervical, supraclavicular, axillary or inguinal lymph nodes.     LABORATORY  Results for FERMIN LORENZANA (MRN [id number]) as of 10/30/2018 10:31   Ref. Range 10/29/2018 14:54 10/29/2018 15:00 10/30/2018 04:13   Sodium Latest Ref Range: 135 - 145 mmol/L 137  140   Potassium Latest Ref Range: 3.4 - 5.1 mmol/L 4.3  3.9   Chloride Latest Ref Range: 98 - 107 mmol/L 101  105   CO2 Latest Ref Range: 21 - 32 mmol/L 29  27   ANION GAP Latest Ref Range: 10 - 20 mmol/L 11  12   Sodium POC Latest Ref Range: 135 - 145 mmol/L  138    Potassium POC Latest Ref Range: 3.4 - 5.1 mmol/L  4.2    POCT Chloride Latest Ref Range: 98 - 107 mmol/L  95 (L)    CO2 Total Latest Ref Range: 19 - 24 mmol/L  29 (H)    POCT Anion Gap Latest Units: mmol/L  20    BUN Latest Ref Range: 6 - 20 mg/dL 18  13   BUN/CREATININE RATIO  Latest Ref Range: 7 - 25  13  12   CALCIUM Latest Ref Range: 8.4 - 10.2 mg/dL 9.1  8.4   Creatinine Latest Ref Range: 0.67 - 1.17 mg/dL 1.36 (H)  1.09   GFR Estimate,  Unknown 73  >90   GFR Estimate, Non  Unknown 63  82   Glucose Latest Ref Range: 65 - 99 mg/dL 305 (H)  317 (H)   Results for EFRMIN LORENZANA (MRN [id number]) as of 10/30/2018 10:31   Ref. Range 10/29/2018 14:54 10/29/2018 15:00 10/30/2018 04:13   WBC Latest Ref Range: 4.2 - 11.0 K/mcL 6.5  5.0   RBC Latest Ref Range: 4.50 - 5.90 mil/mcL 4.41 (L)  4.05 (L)   HGB Latest Ref Range: 13.0 - 17.0 g/dL 12.8 (L)  11.7 (L)   HCT Latest Ref Range: 39.0 - 51.0 % 39.6  36.4 (L)   MCV Latest Ref Range: 78.0 - 100.0 fl 89.8  89.9   MCH Latest Ref Range: 26.0 - 34.0 pg 29.0  28.9   MCHC Latest Ref Range: 32.0 - 36.5 g/dL 32.3  32.1   RDW-CV Latest Ref Range: 11.0 - 15.0 % 15.8 (H)  15.7 (H)   PLT Latest Ref Range: 140 - 450 K/mcL 173  161   NRBC Latest Ref Range: 0 /100 WBC 0  0   DIFFERENTIAL TYPE Unknown AUTOMATED DIFFERE...  AUTOMATED DIFFERE...   Neutrophil Latest Units: % 69  61       IMAGES    No new images.     MICROBIOLOGY:     Urine culture: pending.   UA: borderline.     ASSESSMENT:     44 year old y.o. male with history of  anxiety, chronic low back pain, depression, diabetes mellitus, chronic leg infection admitted with R groin pain, draining, erythema. Foul smelling urine.     1. R groin cellulitis. Surgery concerned for necrotizing fascitis. Very possible.   MRSA, pseudomonas, anaerobes. Patient is not toxic. No sepsis.   2. History of DM, depression, morbid obesity. MS        PLAN:     1. Antibiotics to zyvox, flagyl, cefepime.   zyvox for toxin production control instead of clindamycin.   2. CT ordered by surgery. Will follow.   May need surgical intervention. I would appreciate tissue cultures if any procedure done.   3. Wound care to see patient.   4. Follow Urine culture.     Modify antibiotics according to results of  cultures.     Thank you very much for calling me.     Do CK. Procalcitonin. hold citalopram and amitriptyline.            No

## 2021-02-08 NOTE — ED ADULT TRIAGE NOTE - CHIEF COMPLAINT QUOTE
Patient complaining of chest pain , dizziness and dry mouth. Pt had her first dose of the COVID-19 vaccine 5 days ago.

## 2021-02-08 NOTE — ED PROVIDER NOTE - CLINICAL SUMMARY MEDICAL DECISION MAKING FREE TEXT BOX
79 y/o F with PMHx of hypertension presenting with months of dizziness, headache, and dry mouth accompanied by 5 days of night sweats and atypical chest pain. On arrival, Pt is non-toxic appearing. No vital sign derangements. EKG is non-ischemic. Non focal neurological exam. No loud murmurs. Lungs are clear. Plan for cardiac monitoring, labs, and imaging.

## 2021-02-11 RX ORDER — CEFUROXIME AXETIL 250 MG
1 TABLET ORAL
Qty: 10 | Refills: 0
Start: 2021-02-11 | End: 2021-02-15

## 2021-02-11 NOTE — ED POST DISCHARGE NOTE - DETAILS
left VM regarding results and prescription. sent cefuroxime to pharmacy as per sensitivities. listed allergy to cipro. 2/12 1:22pm spoke with patient and discussed results, antibiotics sent yesterday to pharmacy. advised f/u pmd, return precautions discussed. patient agrees with plan

## 2021-02-12 DIAGNOSIS — R68.83 CHILLS (WITHOUT FEVER): ICD-10-CM

## 2021-02-12 DIAGNOSIS — R07.89 OTHER CHEST PAIN: ICD-10-CM

## 2021-02-12 DIAGNOSIS — R68.2 DRY MOUTH, UNSPECIFIED: ICD-10-CM

## 2021-02-12 DIAGNOSIS — I10 ESSENTIAL (PRIMARY) HYPERTENSION: ICD-10-CM

## 2021-02-12 DIAGNOSIS — E78.5 HYPERLIPIDEMIA, UNSPECIFIED: ICD-10-CM

## 2021-02-12 DIAGNOSIS — R42 DIZZINESS AND GIDDINESS: ICD-10-CM

## 2021-04-15 ENCOUNTER — APPOINTMENT (OUTPATIENT)
Dept: VASCULAR SURGERY | Facility: CLINIC | Age: 81
End: 2021-04-15
Payer: MEDICARE

## 2021-04-15 VITALS
OXYGEN SATURATION: 95 % | SYSTOLIC BLOOD PRESSURE: 129 MMHG | TEMPERATURE: 97.8 F | DIASTOLIC BLOOD PRESSURE: 83 MMHG | HEART RATE: 72 BPM

## 2021-04-15 PROCEDURE — 93970 EXTREMITY STUDY: CPT

## 2021-04-15 PROCEDURE — 99214 OFFICE O/P EST MOD 30 MIN: CPT | Mod: 25

## 2021-04-15 NOTE — PHYSICAL EXAM
[JVD] : no jugular venous distention  [2+] : left 2+ [Ankle Swelling (On Exam)] : present [Ankle Swelling On The Left] : moderate [Varicose Veins Of Lower Extremities] : present [Varicose Veins Of The Right Leg] : of the right leg [Ankle Swelling Bilaterally] : severe [] : bilaterally [Ankle Swelling On The Right] : mild [No Rash or Lesion] : No rash or lesion [Purpura] : no purpura  [Petechiae] : no petechiae [Skin Ulcer] : no ulcer [Skin Induration] : no induration [Alert] : alert [Oriented to Person] : oriented to person [Oriented to Place] : oriented to place [Oriented to Time] : oriented to time [Calm] : calm [de-identified] : wdwn nad [de-identified] : wnl [de-identified] : maryannel [FreeTextEntry1] : rt calf large dilated branch veins [de-identified] : wnl [de-identified] : as above

## 2021-04-15 NOTE — PROCEDURE
EXAMINATION TYPE: XR lumbar spine 2 or 3V

 

DATE OF EXAM: 2/21/2017 5:09 PM

 

COMPARISON: NONE

 

HISTORY: Low back pain

 

TECHNIQUE: 3 views

 

FINDINGS: There is a slight lumbar dextroscoliosis. There is mild degenerative disc space narrowing t
hroughout the lumbar spine with spurring of the endplates. There is no compression fracture. Posterio
r elements are intact. Sacroiliac joints are normal.

 

IMPRESSION: Multilevel spondylosis. Minimal dextroscoliosis. No fracture seen.
[FreeTextEntry1] : Vascular surgeon discussed with the patient:\par Varicose veins are enlarged, twisted veins. Varicose veins are caused by increased blood pressure in the veins.  The blood moves towards the heart by 1-way valves in the veins. When the valves become weakened or damaged, blood can collect in the veins. This causes the veins to become enlarged. Sitting or standing for long periods can cause blood to pool in the leg veins, increasing the pressure within the veins. The veins can stretch from the increased pressure. This may weaken the walls of the veins and damage the valves.\par Varicose veins may be more common in some families (inherited).  Factors that may increase pressure include:  obesity, older age, being female, pregnancy, taking oral contraceptive pills or hormone replacement, being inactive, and/or smoking. \par The most common symptoms of varicose veins are sensations in the legs, such as a heavy feeling, burning, and/or aching. However, each individual may experience symptoms differently.  Other symptoms may include:  color changes in the skin, sores on the legs, or rash.  Severe varicose veins may eventually produce long-term mild swelling that can result in more serious skin and tissue problems, such as ulcers and non-healing sores.\par Varicose veins are diagnosed by a complete medical history, physical examination, and diagnostic studies for varicose veins including duplex ultrasound and color-flow imaging.  \par Medical treatment for varicose veins include:  compression stockings, sclerotherapy, endovenous laser ablation and/or surgical treatment microphlebectomy.  \par \par \par

## 2021-04-15 NOTE — DATA REVIEWED
[FreeTextEntry1] : b/l vle performed by rvt - see results - no truncal reflux, dvt or svt in b/l legs

## 2021-04-24 ENCOUNTER — EMERGENCY (EMERGENCY)
Facility: HOSPITAL | Age: 81
LOS: 1 days | Discharge: HOME | End: 2021-04-24
Attending: EMERGENCY MEDICINE | Admitting: EMERGENCY MEDICINE
Payer: MEDICARE

## 2021-04-24 VITALS
DIASTOLIC BLOOD PRESSURE: 78 MMHG | HEIGHT: 62 IN | WEIGHT: 184.97 LBS | RESPIRATION RATE: 16 BRPM | HEART RATE: 69 BPM | OXYGEN SATURATION: 95 % | SYSTOLIC BLOOD PRESSURE: 132 MMHG | TEMPERATURE: 98 F

## 2021-04-24 DIAGNOSIS — M25.562 PAIN IN LEFT KNEE: ICD-10-CM
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
## 2021-04-24 DIAGNOSIS — Z91.018 ALLERGY TO OTHER FOODS: ICD-10-CM

## 2021-04-24 DIAGNOSIS — I10 ESSENTIAL (PRIMARY) HYPERTENSION: ICD-10-CM

## 2021-04-24 DIAGNOSIS — E78.5 HYPERLIPIDEMIA, UNSPECIFIED: ICD-10-CM
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
## 2021-04-24 DIAGNOSIS — Z98.890 OTHER SPECIFIED POSTPROCEDURAL STATES: Chronic | ICD-10-CM

## 2021-04-24 DIAGNOSIS — Z88.1 ALLERGY STATUS TO OTHER ANTIBIOTIC AGENTS STATUS: ICD-10-CM

## 2021-04-24 PROCEDURE — 73590 X-RAY EXAM OF LOWER LEG: CPT | Mod: 26,LT

## 2021-04-24 PROCEDURE — 73564 X-RAY EXAM KNEE 4 OR MORE: CPT | Mod: 26,LT

## 2021-04-24 PROCEDURE — 99283 EMERGENCY DEPT VISIT LOW MDM: CPT | Mod: 25

## 2021-04-24 RX ORDER — IBUPROFEN 200 MG
600 TABLET ORAL ONCE
Refills: 0 | Status: COMPLETED | OUTPATIENT
Start: 2021-04-24 | End: 2021-04-24

## 2021-04-24 RX ADMIN — Medication 600 MILLIGRAM(S): at 16:20

## 2021-04-24 NOTE — ED PROVIDER NOTE - CARE PROVIDER_API CALL
Robel Donald)  Orthopaedic Surgery Surgery  [address] [place] [address], [address]  [place] [address]  Phone: [phone]  Fax: [phone]  Follow Up Time: 1-3 Days

## 2021-04-24 NOTE — ED PROVIDER NOTE - MUSCULOSKELETAL, MLM
Mild swelling to left knee. Negative varus and valgus test. Negative Lachman. No swelling to left calf. Compartments of lower left extremity normal. Neurovascularly intact. Pt is able to stand and weight bear with mild to moderate pain of the left knee.

## 2021-04-24 NOTE — ED PROVIDER NOTE - OBJECTIVE STATEMENT
Triage VS: HR: 69 BP: 132/78 Resp.: 16 Temp.: 98.3 F O2: 95    82 yo F presents with 4 days of left knee pain that is non progressive. She said that 4 days ago she was standing and twisted her left knee and heard a "crunch" with subsequent pain that has persisted. Pt states she has been fully ambulatory with moderate weight bearing to the left knee since the incident. Pt did not feel or hear a pop and does not feel that her knee is unstable. Pt has no leg, ankle, or foot pain. Pt denies swelling or pain to her left leg. Triage VS: HR: 69 BP: 132/78 Resp.: 16 Temp.: 98.3 F O2: 95  Myself, and the patient were present during the examination.     82 yo F presents with 4 days of left knee pain that is non progressive. She said that 4 days ago she was standing and twisted her left knee and heard a "crunch" with subsequent pain that has persisted. Pt states she has been fully ambulatory with moderate weight bearing to the left knee since the incident. Pt did not feel or hear a pop and does not feel that her knee is unstable. Pt has no leg, ankle, or foot pain. Pt denies swelling or pain to her left leg.

## 2021-04-24 NOTE — ED PROVIDER NOTE - PATIENT PORTAL LINK FT
You can access the FollowMyHealth Patient Portal offered by [facility] by registering at the following website: [email or web address]. By joining [facility]’s FollowMyHealth portal, you will also be able to view your health information using other applications (apps) compatible with our system.

## 2021-04-24 NOTE — ED PROVIDER NOTE - NSFOLLOWUPINSTRUCTIONS_ED_ALL_ED_FT
follow up with orthopedics     ibuprofen for pain     return to the ER for worsening pain or any other concern

## 2021-05-04 ENCOUNTER — APPOINTMENT (OUTPATIENT)
Dept: VASCULAR SURGERY | Facility: CLINIC | Age: 81
End: 2021-05-04

## 2021-05-17 NOTE — ED PROVIDER NOTE - HEME LYMPH, MLM
1 week PO: Patient is doing well post-operatively. The importance of post-op drop compliance was emphasized. Drop scheduled reviewed with patient. Patient to call if any visual changes or concerns. No adenopathy or splenomegaly. No cervical or inguinal lymphadenopathy.

## 2021-07-09 ENCOUNTER — EMERGENCY (EMERGENCY)
Facility: HOSPITAL | Age: 81
LOS: 1 days | Discharge: HOME | End: 2021-07-09
Attending: EMERGENCY MEDICINE | Admitting: EMERGENCY MEDICINE
Payer: MEDICARE

## 2021-07-09 VITALS
OXYGEN SATURATION: 97 % | DIASTOLIC BLOOD PRESSURE: 82 MMHG | RESPIRATION RATE: 16 BRPM | TEMPERATURE: 98 F | HEART RATE: 63 BPM | SYSTOLIC BLOOD PRESSURE: 145 MMHG

## 2021-07-09 VITALS
DIASTOLIC BLOOD PRESSURE: 91 MMHG | RESPIRATION RATE: 18 BRPM | SYSTOLIC BLOOD PRESSURE: 134 MMHG | WEIGHT: 164.91 LBS | TEMPERATURE: 98 F | HEART RATE: 81 BPM | OXYGEN SATURATION: 99 % | HEIGHT: 62 IN

## 2021-07-09 DIAGNOSIS — R55 SYNCOPE AND COLLAPSE: ICD-10-CM

## 2021-07-09 DIAGNOSIS — Z98.890 OTHER SPECIFIED POSTPROCEDURAL STATES: Chronic | ICD-10-CM

## 2021-07-09 DIAGNOSIS — Z91.018 ALLERGY TO OTHER FOODS: ICD-10-CM

## 2021-07-09 DIAGNOSIS — I10 ESSENTIAL (PRIMARY) HYPERTENSION: ICD-10-CM

## 2021-07-09 DIAGNOSIS — Z88.1 ALLERGY STATUS TO OTHER ANTIBIOTIC AGENTS STATUS: ICD-10-CM

## 2021-07-09 LAB
ALBUMIN SERPL ELPH-MCNC: 4 G/DL — SIGNIFICANT CHANGE UP (ref 3.4–5)
ALP SERPL-CCNC: 82 U/L — SIGNIFICANT CHANGE UP (ref 40–120)
ALT FLD-CCNC: 32 U/L — SIGNIFICANT CHANGE UP (ref 12–42)
ANION GAP SERPL CALC-SCNC: 13 MMOL/L — SIGNIFICANT CHANGE UP (ref 9–16)
APPEARANCE UR: CLEAR — SIGNIFICANT CHANGE UP
AST SERPL-CCNC: 29 U/L — SIGNIFICANT CHANGE UP (ref 15–37)
BASOPHILS # BLD AUTO: 0.05 K/UL — SIGNIFICANT CHANGE UP (ref 0–0.2)
BASOPHILS NFR BLD AUTO: 0.5 % — SIGNIFICANT CHANGE UP (ref 0–2)
BILIRUB SERPL-MCNC: 0.6 MG/DL — SIGNIFICANT CHANGE UP (ref 0.2–1.2)
BILIRUB UR-MCNC: ABNORMAL
BUN SERPL-MCNC: 45 MG/DL — HIGH (ref 7–23)
CALCIUM SERPL-MCNC: 8.9 MG/DL — SIGNIFICANT CHANGE UP (ref 8.5–10.5)
CHLORIDE SERPL-SCNC: 98 MMOL/L — SIGNIFICANT CHANGE UP (ref 96–108)
CO2 SERPL-SCNC: 23 MMOL/L — SIGNIFICANT CHANGE UP (ref 22–31)
COLOR SPEC: YELLOW — SIGNIFICANT CHANGE UP
CREAT SERPL-MCNC: 1.84 MG/DL — HIGH (ref 0.5–1.3)
DIFF PNL FLD: NEGATIVE — SIGNIFICANT CHANGE UP
EOSINOPHIL # BLD AUTO: 0.03 K/UL — SIGNIFICANT CHANGE UP (ref 0–0.5)
EOSINOPHIL NFR BLD AUTO: 0.3 % — SIGNIFICANT CHANGE UP (ref 0–6)
GLUCOSE SERPL-MCNC: 127 MG/DL — HIGH (ref 70–99)
GLUCOSE UR QL: NEGATIVE — SIGNIFICANT CHANGE UP
HCT VFR BLD CALC: 43.8 % — SIGNIFICANT CHANGE UP (ref 34.5–45)
HGB BLD-MCNC: 15 G/DL — SIGNIFICANT CHANGE UP (ref 11.5–15.5)
IMM GRANULOCYTES NFR BLD AUTO: 0.3 % — SIGNIFICANT CHANGE UP (ref 0–1.5)
KETONES UR-MCNC: ABNORMAL MG/DL
LEUKOCYTE ESTERASE UR-ACNC: ABNORMAL
LYMPHOCYTES # BLD AUTO: 2.2 K/UL — SIGNIFICANT CHANGE UP (ref 1–3.3)
LYMPHOCYTES # BLD AUTO: 22 % — SIGNIFICANT CHANGE UP (ref 13–44)
MCHC RBC-ENTMCNC: 31.2 PG — SIGNIFICANT CHANGE UP (ref 27–34)
MCHC RBC-ENTMCNC: 34.2 GM/DL — SIGNIFICANT CHANGE UP (ref 32–36)
MCV RBC AUTO: 91.1 FL — SIGNIFICANT CHANGE UP (ref 80–100)
MONOCYTES # BLD AUTO: 1 K/UL — HIGH (ref 0–0.9)
MONOCYTES NFR BLD AUTO: 10 % — SIGNIFICANT CHANGE UP (ref 2–14)
NEUTROPHILS # BLD AUTO: 6.7 K/UL — SIGNIFICANT CHANGE UP (ref 1.8–7.4)
NEUTROPHILS NFR BLD AUTO: 66.9 % — SIGNIFICANT CHANGE UP (ref 43–77)
NITRITE UR-MCNC: NEGATIVE — SIGNIFICANT CHANGE UP
NRBC # BLD: 0 /100 WBCS — SIGNIFICANT CHANGE UP (ref 0–0)
PH UR: 5.5 — SIGNIFICANT CHANGE UP (ref 5–8)
PLATELET # BLD AUTO: 212 K/UL — SIGNIFICANT CHANGE UP (ref 150–400)
POTASSIUM SERPL-MCNC: 3.7 MMOL/L — SIGNIFICANT CHANGE UP (ref 3.5–5.3)
POTASSIUM SERPL-SCNC: 3.7 MMOL/L — SIGNIFICANT CHANGE UP (ref 3.5–5.3)
PROT SERPL-MCNC: 7.8 G/DL — SIGNIFICANT CHANGE UP (ref 6.4–8.2)
PROT UR-MCNC: 30 MG/DL
RBC # BLD: 4.81 M/UL — SIGNIFICANT CHANGE UP (ref 3.8–5.2)
RBC # FLD: 13.4 % — SIGNIFICANT CHANGE UP (ref 10.3–14.5)
SODIUM SERPL-SCNC: 134 MMOL/L — SIGNIFICANT CHANGE UP (ref 132–145)
SP GR SPEC: >=1.03 — SIGNIFICANT CHANGE UP (ref 1–1.03)
TROPONIN I SERPL-MCNC: 0.03 NG/ML — SIGNIFICANT CHANGE UP (ref 0.02–0.06)
UROBILINOGEN FLD QL: 1 E.U./DL — SIGNIFICANT CHANGE UP
WBC # BLD: 10.01 K/UL — SIGNIFICANT CHANGE UP (ref 3.8–10.5)
WBC # FLD AUTO: 10.01 K/UL — SIGNIFICANT CHANGE UP (ref 3.8–10.5)

## 2021-07-09 PROCEDURE — 99285 EMERGENCY DEPT VISIT HI MDM: CPT | Mod: 25

## 2021-07-09 PROCEDURE — 93010 ELECTROCARDIOGRAM REPORT: CPT

## 2021-07-09 PROCEDURE — 71045 X-RAY EXAM CHEST 1 VIEW: CPT | Mod: 26

## 2021-07-09 RX ORDER — SODIUM CHLORIDE 9 MG/ML
1000 INJECTION, SOLUTION INTRAVENOUS
Refills: 0 | Status: DISCONTINUED | OUTPATIENT
Start: 2021-07-09 | End: 2021-07-13

## 2021-07-09 RX ORDER — SODIUM CHLORIDE 9 MG/ML
2000 INJECTION INTRAMUSCULAR; INTRAVENOUS; SUBCUTANEOUS ONCE
Refills: 0 | Status: COMPLETED | OUTPATIENT
Start: 2021-07-09 | End: 2021-07-09

## 2021-07-09 RX ADMIN — SODIUM CHLORIDE 1000 MILLILITER(S): 9 INJECTION, SOLUTION INTRAVENOUS at 17:50

## 2021-07-09 RX ADMIN — SODIUM CHLORIDE 2000 MILLILITER(S): 9 INJECTION INTRAMUSCULAR; INTRAVENOUS; SUBCUTANEOUS at 17:53

## 2021-07-09 RX ADMIN — SODIUM CHLORIDE 2000 MILLILITER(S): 9 INJECTION INTRAMUSCULAR; INTRAVENOUS; SUBCUTANEOUS at 16:33

## 2021-07-09 NOTE — ED ADULT NURSE NOTE - NSIMPLEMENTINTERV_GEN_ALL_ED
Implemented All Universal Safety Interventions:  [place] to call system. Call bell, personal items and telephone within reach. Instruct patient to call for assistance. Room bathroom lighting operational. Non-slip footwear when patient is off stretcher. Physically safe environment: no spills, clutter or unnecessary equipment. Stretcher in lowest position, wheels locked, appropriate side rails in place. Implemented All Fall Risk Interventions:  [place] to call system. Call bell, personal items and telephone within reach. Instruct patient to call for assistance. Room bathroom lighting operational. Non-slip footwear when patient is off stretcher. Physically safe environment: no spills, clutter or unnecessary equipment. Stretcher in lowest position, wheels locked, appropriate side rails in place. Provide visual cue, wrist band, yellow gown, etc. Monitor gait and stability. Monitor for mental status changes and reorient to person, place, and time. Review medications for side effects contributing to fall risk. Reinforce activity limits and safety measures with patient and family.

## 2021-07-09 NOTE — ED PROVIDER NOTE - CLINICAL SUMMARY MEDICAL DECISION MAKING FREE TEXT BOX
No trauma, VSS, + pre-renal azotemia consistent with dehydration. Given 3L IVF, tolerating PO, feeling back to normal state of health, ambulatory in ED, here with partner, discharging together. Given return precautions. has appropriate f/u.

## 2021-07-09 NOTE — ED ADULT NURSE REASSESSMENT NOTE - NS ED NURSE REASSESS COMMENT FT1
Pt ambulates w/ RN and family member to bathroom.  Steady gait.  Endorses to RN she "feels fine."  MD made aware.

## 2021-07-09 NOTE — ED PROVIDER NOTE - NSFOLLOWUPINSTRUCTIONS_ED_ALL_ED_FT
Dehydration    WHAT YOU NEED TO KNOW:    Dehydration is a condition that develops when your body does not have enough fluid. You may become dehydrated if you do not drink enough water or lose too much fluid. Fluid loss may also cause loss of electrolytes (minerals), such as sodium.    DISCHARGE INSTRUCTIONS:    Return to the emergency department if:   •You have a seizure.      •You are confused or cannot think clearly.      •You are extremely sleepy, or another person cannot wake you.       •You become dizzy or faint when you stand.      •You are not able to urinate.      •You have trouble breathing.      •You have a fast or irregular heartbeat.      •Your hands or feet are cold, or your face is pale.       Contact your healthcare provider if:   •You have trouble drinking liquids because you are vomiting.      •Your symptoms get worse.      •You have a fever.       •You feel very weak or tired.      •You have questions or concerns about your condition or care.      Follow up with your healthcare provider as directed: Write down your questions so you remember to ask them during your visits.     Prevent or manage dehydration:   •Drink liquids as directed. Liquids that contain water, sugar, and minerals can help your body hold in fluid and help prevent dehydration. Drink liquids throughout the day, not just when you feel thirsty. Men should drink about 3 liters (13 eight-ounce cups) of liquid each day. Women should drink about 2 liters (9 eight-ounce cups) of liquid each day. Drink even more liquid if you will be outdoors, in the sun for a long time, or exercising.       •Stay cool. Limit the time you spend outdoors during the hottest part of the day. Dress in lightweight clothes.       •Keep track of how often you urinate. If you urinate less than usual or your urine is darker, drink more liquids.    ---------------------------------------------------------------------------------      Near Syncope    WHAT YOU NEED TO KNOW:    Near syncope, also called presyncope, is the feeling that you may faint (lose consciousness), but you do not. You can control some health conditions that cause near syncope. Your healthcare providers can help you create a plan to manage near syncope and prevent episodes.    DISCHARGE INSTRUCTIONS:    Return to the emergency department if:   •You have sudden chest pain.       •You have trouble breathing or shortness of breath.       •You have vision changes, are sweating, and have nausea while you are sitting or lying down.       •You feel dizzy or flushed and your heart is fluttering.      •You lose consciousness.      •You cannot use your arm, hand, foot, or leg, or it feels weak.      •You have trouble speaking or understanding others when they speak.      Contact your healthcare provider if:   •You have new or worsening symptoms.      •Your heart beats faster or slower than usual.       •You have questions or concerns about your condition or care.      Medicines:   •Medicines may be needed to help your heart pump strongly and regularly. Your healthcare provider may also make changes to any medicines that are causing syncope.       •Take your medicine as directed. Contact your healthcare provider if you think your medicine is not helping or if you have side effects. Tell him or her if you are allergic to any medicine. Keep a list of the medicines, vitamins, and herbs you take. Include the amounts, and when and why you take them. Bring the list or the pill bottles to follow-up visits. Carry your medicine list with you in case of an emergency.      Follow up with your healthcare provider as directed: You may need more tests to help find the cause of your near syncope episodes. The tests will help healthcare providers plan the best treatment for you. Write down your questions so you remember to ask them during your visits.     Manage near syncope:   •Sit or lie down when needed. This includes when you feel dizzy, your throat is getting tight, and your vision changes. Raise your legs above the level of your heart.      •Take slow, deep breaths if you start to breathe faster with anxiety or fear. This can help decrease dizziness and the feeling that you might faint.       •Keep a record of your near syncope episodes. Include your symptoms and your activity before and after the episode. The record can help your healthcare provider find the cause of your near syncope and help you manage episodes.      Prevent a near syncope episode:   •Move slowly and let yourself get used to one position before you move to another position. This is very important when you change from a lying or sitting position to a standing position. Take some deep breaths before you stand up from a lying position. Stand up slowly. Sudden movements may cause a fainting spell. Sit on the side of the bed or couch for a few minutes before you stand up. If you are on bedrest, try to be upright for about 2 hours each day, or as directed. Do not lock your legs if you are standing for a long period of time. Move your legs and bend your knees to keep blood flowing.      •Follow your healthcare provider's recommendations. Your provider may recommend that you drink more liquids to prevent dehydration. You may also need to have more salt to keep your blood pressure from dropping too low and causing syncope. Your provider will tell you how much liquid and sodium to have each day. He or she will also tell you how much physical activity is safe for you. This will depend on what is causing your near syncope.      •Watch for signs of low blood sugar. These include hunger, nervousness, sweating, and fast or fluttery heartbeats. Talk with your healthcare provider about ways to keep your blood sugar level steady.      •Check your blood pressure often. This is important if you take medicine to lower your blood pressure. Check your blood pressure when you are lying down and when you are standing. Ask how often to check during the day. Keep a record of your blood pressure numbers. Your healthcare provider may use the record to help plan your treatment.  How to take a Blood Pressure           •Do not strain if you are constipated. You may faint if you strain to have a bowel movement. Walking is the best way to get your bowels moving. Eat foods high in fiber to make it easier to have a bowel movement. Good examples are high-fiber cereals, beans, vegetables, and whole-grain breads. Prune juice may help make bowel movements softer.      •Do not exercise outside on a hot day. The combination of physical activity and heat can lead to dehydration. This can cause syncope.

## 2021-07-09 NOTE — ED ADULT TRIAGE NOTE - CHIEF COMPLAINT QUOTE
elderly pt biba from [facility] for syncope while shopping arrives to triage [id number] states she was feeling overheated. bgl on scene 159, 12 lead by ekg nsr

## 2021-07-09 NOTE — ED ADULT NURSE NOTE - CAS TRG GENERAL NORM CIRC DET
What to watch out for are: regular contractions every 5 min, vaginal bleeding, decreased fetal movement, or leakage of fluid.  Please call the office or go to L&D if you develop any of these signs and symptoms.      I will see you for your follow up appointment.  Please feel free to call if you have any questions or concerns.      Thanks,  Margarita Calloway, DO     Strong peripheral pulses

## 2021-07-09 NOTE — ED ADULT NURSE REASSESSMENT NOTE - NS ED NURSE REASSESS COMMENT FT1
Pt rcvd from [facility].  Pt w/ fluids in progress.  Pt A&Ox3, pending imaging results.  Falls precautions in place.

## 2021-07-09 NOTE — ED ADULT TRIAGE NOTE - ESI TRIAGE ACUITY LEVEL, MLM
Spoke with patient - he has been pushing fluids and his discomfort is better.  Urine culture is pending.  He also is having heartburn and other symptoms with a cardiac/CABG history - last NM Stress Test Feb.  Requested he contact his PCP regarding also.      Will call him when culture results with plan.   2

## 2021-07-09 NOTE — ED PROVIDER NOTE - OBJECTIVE STATEMENT
82 y/o F w/hx HTN p/w near syncope, felt lightheaded/fatigued in a CVS, slid down to the ground and EMS was called. No CP/SOB. No palpitations. No fever/chills/recent illness. Admits to having nothing to eat or drink in the morning or for dinner last night. No similar episodes in the past. No GI/ sx.

## 2021-07-09 NOTE — ED PROVIDER NOTE - PATIENT PORTAL LINK FT
You can access the FollowMyHealth Patient Portal offered by [facility] by registering at the following website: [email or web address]. By joining [employer]’s FollowMyHealth portal, you will also be able to view your health information using other applications (apps) compatible with our system.

## 2021-07-09 NOTE — ED ADULT NURSE REASSESSMENT NOTE - NS ED NURSE REASSESS COMMENT FT1
Pt was ambulating to the bathroom with visitor. Pt had near syncopal episode. Wheel chair provided. Pt did not fall. MD Wing at bedside. Pt revitalized. FS done. EKG done. A&Ox3 speaking in full sentences will continue to monitor. Pt currently on bedpan at this time.

## 2022-01-01 NOTE — ED ADULT NURSE NOTE - NS_NURSE_DISC_TEACHING_YN_ED_ALL_ED
Breastfeeding Services Note    NICU Infant and Family seen as follow up visit for lactation consultation.    Assessment and Teaching with Mom/family of the following:    -using breast pump to help maintain supply  -Appropriate expectations for gestational age  -Pump flange size appropriate    Met with parents at bedside, mother reports supply decreased, has started her period. Is pumping at once or twice at nighttime. No other concerns.     Lactation will follow       Yes

## 2022-02-22 ENCOUNTER — APPOINTMENT (OUTPATIENT)
Dept: VASCULAR SURGERY | Facility: CLINIC | Age: 82
End: 2022-02-22
Payer: MEDICARE

## 2022-02-22 VITALS
HEART RATE: 79 BPM | SYSTOLIC BLOOD PRESSURE: 163 MMHG | OXYGEN SATURATION: 95 % | DIASTOLIC BLOOD PRESSURE: 93 MMHG | TEMPERATURE: 98.1 F

## 2022-02-22 DIAGNOSIS — I87.2 VENOUS INSUFFICIENCY (CHRONIC) (PERIPHERAL): ICD-10-CM

## 2022-02-22 PROCEDURE — 99214 OFFICE O/P EST MOD 30 MIN: CPT | Mod: 25

## 2022-02-22 PROCEDURE — 93970 EXTREMITY STUDY: CPT

## 2022-02-22 NOTE — PROCEDURE
[FreeTextEntry1] : pt to wear support hose daily and exercise to lose weight \par \par followup prn.

## 2022-02-22 NOTE — PHYSICAL EXAM
[JVD] : no jugular venous distention  [2+] : left 2+ [Ankle Swelling (On Exam)] : present [Ankle Swelling On The Left] : moderate [Varicose Veins Of Lower Extremities] : present [Varicose Veins Of The Right Leg] : of the right leg [Ankle Swelling Bilaterally] : severe [] : bilaterally [Ankle Swelling On The Right] : mild [No Rash or Lesion] : No rash or lesion [Purpura] : no purpura  [Petechiae] : no petechiae [Skin Ulcer] : no ulcer [Skin Induration] : no induration [Alert] : alert [Oriented to Person] : oriented to person [Oriented to Place] : oriented to place [Oriented to Time] : oriented to time [Calm] : calm [de-identified] : wdwn nad [de-identified] : wnl [de-identified] : maryannel [FreeTextEntry1] : rt calf large dilated branch veins [de-identified] : wnl [de-identified] : as above

## 2022-02-22 NOTE — REASON FOR VISIT
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 Arterial pulses intact without compromise.

## 2022-02-22 NOTE — HISTORY OF PRESENT ILLNESS
[FreeTextEntry1] : New Rx given for use of daily support hose and pt will walk daily and attempt to lose weight.

## 2022-04-27 NOTE — ED ADULT NURSE NOTE - BEFAST SCREENING
FAX      AUTHORIZATION FOR RELEASE OF   CONFIDENTIAL INFORMATION        [facility] MEDICAL RECORDS DEPT    We are seeing Gilda Kaplan, date of birth 1946, in the clinic at [facility]. Keren Shafer MD is the patient's PCP. Gilda Kaplan has an outstanding lab/procedure at the time we reviewed their chart. In order to help keep their health information updated, Gilda Kaplan has authorized us to request the following medical record(s):       ( X )  COLONOSCOPY (WITHIN 10 YRS)      Please fax records to [facility]  [phone]     If you have any questions, please contact Genny at [phone].    Genny Vallejo LPN  Performance Improvement Coordinator  [facility] [address] [place] [address] [phone]           Negative

## 2022-04-29 NOTE — ED PROVIDER NOTE - EYES NEGATIVE STATEMENT, MLM
Attempted to reach RN-Miss Brizuela--cell phone rang once then phone disconnected.  Attempted second time and phone rang twice and then disconnected, will attempt again later today.   no visual changes.

## 2022-08-29 ENCOUNTER — APPOINTMENT (OUTPATIENT)
Dept: ORTHOPEDIC SURGERY | Facility: CLINIC | Age: 82
End: 2022-08-29

## 2022-08-29 PROCEDURE — 99203 OFFICE O/P NEW LOW 30 MIN: CPT

## 2022-08-29 PROCEDURE — 27760 CLTX MEDIAL ANKLE FX: CPT

## 2022-08-29 NOTE — HISTORY OF PRESENT ILLNESS
[] : yes [de-identified] : C/o pain in the Right foot/ankle. Problem started 1 week ago, patient was exercising and fell backward when this happened she caught her foot under her bed. The patient initially did not have any pain just had a lot of swelling and bruising. Patient states since the swelling has gone down she is now experiencing more pain. Patient went to Dr Mims on 8/28/22 had xrays done and was ref'd to Dr Kelly [de-identified] : 8/28/22 [de-identified] : Dr. Mims [de-identified] : XRs

## 2022-08-29 NOTE — PHYSICAL EXAM
[Moderate] : moderate diffused ankle swelling [4___] : eversion 4[unfilled]/5 [1+] : dorsalis pedis pulse: 1+ [Medial malleolus fracture] : Medial malleolus fracture [Degenerative change] : Degenerative change [Right] : right tibia/fibula [] : no erythema [de-identified] : plantar flexion 0 degrees [de-identified] : inversion 0 degrees [de-identified] : eversion 0 degrees [TWNoteComboBox7] : dorsiflexion 0 degrees

## 2022-08-30 ENCOUNTER — APPOINTMENT (OUTPATIENT)
Dept: ORTHOPEDIC SURGERY | Facility: CLINIC | Age: 82
End: 2022-08-30

## 2022-09-06 ENCOUNTER — APPOINTMENT (OUTPATIENT)
Dept: ORTHOPEDIC SURGERY | Facility: CLINIC | Age: 82
End: 2022-09-06

## 2022-09-06 PROCEDURE — 73610 X-RAY EXAM OF ANKLE: CPT | Mod: RT

## 2022-09-06 PROCEDURE — 99024 POSTOP FOLLOW-UP VISIT: CPT

## 2022-09-06 NOTE — PHYSICAL EXAM
[Mild] : mild diffused ankle swelling [4___] : eversion 4[unfilled]/5 [1+] : dorsalis pedis pulse: 1+ [Medial malleolus fracture] : Medial malleolus fracture [Degenerative change] : Degenerative change [Right] : right tibia/fibula [] : no erythema [FreeTextEntry9] : avulsion fracture unchanged [de-identified] : plantar flexion 0 degrees [de-identified] : inversion 0 degrees [de-identified] : eversion 0 degrees [TWNoteComboBox7] : dorsiflexion 0 degrees

## 2022-09-06 NOTE — HISTORY OF PRESENT ILLNESS
[de-identified] : Patient presents today for follow up RT ankle pain. Patient has discontinued wearing the boot she complains that the boot was making it difficult to walk and causing swelling. She would like to discuss transitioning to a brace. She admits that she only wore the boot for 2 days following her last appointment.

## 2022-09-12 ENCOUNTER — APPOINTMENT (OUTPATIENT)
Dept: ORTHOPEDIC SURGERY | Facility: CLINIC | Age: 82
End: 2022-09-12

## 2022-09-22 ENCOUNTER — APPOINTMENT (OUTPATIENT)
Dept: ORTHOPEDIC SURGERY | Facility: CLINIC | Age: 82
End: 2022-09-22

## 2022-09-22 PROCEDURE — 99024 POSTOP FOLLOW-UP VISIT: CPT

## 2022-09-22 PROCEDURE — 73610 X-RAY EXAM OF ANKLE: CPT | Mod: RT

## 2022-09-22 NOTE — PHYSICAL EXAM
[Mild] : mild diffused ankle swelling [4___] : eversion 4[unfilled]/5 [1+] : dorsalis pedis pulse: 1+ [Medial malleolus fracture] : Medial malleolus fracture [Degenerative change] : Degenerative change [Right] : right tibia/fibula [Weight -] : weightbearing [The fracture is in acceptable alignment. There is progression in healing seen] : The fracture is in acceptable alignment. There is progression in healing seen [] : patient ambulates with assistive device [de-identified] : brace [FreeTextEntry9] : avulsion fracture unchanged [de-identified] : plantar flexion 10 degrees [de-identified] : inversion 0 degrees [de-identified] : eversion 0 degrees [TWNoteComboBox7] : dorsiflexion 10 degrees

## 2022-09-22 NOTE — DISCUSSION/SUMMARY
[de-identified] : reviewed findings, anatomy and pathology  discussed and pt understands. questions answered, pt left pleased\par

## 2022-10-27 ENCOUNTER — APPOINTMENT (OUTPATIENT)
Dept: ORTHOPEDIC SURGERY | Facility: CLINIC | Age: 82
End: 2022-10-27

## 2022-10-27 PROCEDURE — 99024 POSTOP FOLLOW-UP VISIT: CPT

## 2022-10-27 PROCEDURE — 73610 X-RAY EXAM OF ANKLE: CPT | Mod: RT

## 2022-10-27 NOTE — PHYSICAL EXAM
[Mild] : mild diffused ankle swelling [4___] : eversion 4[unfilled]/5 [1+] : dorsalis pedis pulse: 1+ [Weight -] : weightbearing [The fracture is in acceptable alignment. There is progression in healing seen] : The fracture is in acceptable alignment. There is progression in healing seen [Medial malleolus fracture] : Medial malleolus fracture [Degenerative change] : Degenerative change [Right] : right tibia/fibula [] : patient ambulates with assistive device [FreeTextEntry3] : wears brace\par  [de-identified] : brace [FreeTextEntry9] : healing fx [de-identified] : plantar flexion 10 degrees [de-identified] : inversion 0 degrees [de-identified] : eversion 0 degrees [TWNoteComboBox7] : dorsiflexion 10 degrees

## 2022-10-27 NOTE — DISCUSSION/SUMMARY
[de-identified] : continue brace\par reviewed findings, anatomy and pathology  discussed and pt understands. questions answered, pt left pleased\par

## 2022-11-22 ENCOUNTER — APPOINTMENT (OUTPATIENT)
Dept: ORTHOPEDIC SURGERY | Facility: CLINIC | Age: 82
End: 2022-11-22

## 2022-11-22 DIAGNOSIS — S82.54XA NONDISPLACED FRACTURE OF MEDIAL MALLEOLUS OF RIGHT TIBIA, INITIAL ENCOUNTER FOR CLOSED FRACTURE: ICD-10-CM

## 2022-11-22 PROCEDURE — 73610 X-RAY EXAM OF ANKLE: CPT | Mod: RT

## 2022-11-22 NOTE — PHYSICAL EXAM
[Mild] : mild diffused ankle swelling [4___] : eversion 4[unfilled]/5 [1+] : dorsalis pedis pulse: 1+ [Weight -] : weightbearing [The fracture is in acceptable alignment. There is progression in healing seen] : The fracture is in acceptable alignment. There is progression in healing seen [Medial malleolus fracture] : Medial malleolus fracture [Degenerative change] : Degenerative change [Right] : right tibia/fibula [] : patient ambulates with assistive device [FreeTextEntry3] : wears brace\par  [de-identified] : brace [FreeTextEntry9] : healed fx [de-identified] : plantar flexion 10 degrees [de-identified] : inversion 0 degrees [de-identified] : eversion 0 degrees [TWNoteComboBox7] : dorsiflexion 10 degrees

## 2022-11-22 NOTE — DISCUSSION/SUMMARY
[de-identified] : reviewed findings, anatomy and pathology  discussed and pt understands. questions answered, pt left pleased\par After discussion of Dx & Treatment options was discuss , pt elected to Tx non operatively with exercise , medications, physical therapy and activity modification.\par

## 2022-11-28 ENCOUNTER — APPOINTMENT (OUTPATIENT)
Dept: ORTHOPEDIC SURGERY | Facility: CLINIC | Age: 82
End: 2022-11-28

## 2022-11-28 DIAGNOSIS — S83.242A OTHER TEAR OF MEDIAL MENISCUS, CURRENT INJURY, LEFT KNEE, INITIAL ENCOUNTER: ICD-10-CM

## 2022-11-28 DIAGNOSIS — G89.29 PAIN IN LEFT KNEE: ICD-10-CM

## 2022-11-28 DIAGNOSIS — M17.12 UNILATERAL PRIMARY OSTEOARTHRITIS, LEFT KNEE: ICD-10-CM

## 2022-11-28 DIAGNOSIS — M25.562 PAIN IN LEFT KNEE: ICD-10-CM

## 2022-11-28 PROCEDURE — J3490N: CUSTOM

## 2022-11-28 PROCEDURE — 73562 X-RAY EXAM OF KNEE 3: CPT | Mod: LT

## 2022-11-28 PROCEDURE — 20610 DRAIN/INJ JOINT/BURSA W/O US: CPT

## 2022-11-28 PROCEDURE — 99213 OFFICE O/P EST LOW 20 MIN: CPT | Mod: 25

## 2022-11-28 NOTE — DISCUSSION/SUMMARY
[de-identified] : reviewed findings, anatomy and pathology  discussed and pt understands. questions answered, pt left pleased\par After discussion of Dx & Treatment options was discuss , pt elected to Tx non operatively with exercise , medications, physical therapy and activity modification.\par Patient would benefit from a course/subsequent course of viscosupplementation injections.\par

## 2022-11-28 NOTE — PROCEDURE
[Large Joint Injection] : Large joint injection [Left] : of the left [Knee] : knee [Pain] : pain [Inflammation] : inflammation [Alcohol] : alcohol [Betadine] : betadine [Ethyl Chloride sprayed topically] : ethyl chloride sprayed topically [Sterile technique used] : sterile technique used [___ cc    3mg] :  Betamethasone (Celestone) ~Vcc of 3mg [___ cc    1%] : Lidocaine ~Vcc of 1%  [] : Patient tolerated procedure well [Call if redness, pain or fever occur] : call if redness, pain or fever occur [Apply ice for 15min out of every hour for the next 12-24 hours as tolerated] : apply ice for 15 minutes out of every hour for the next 12-24 hours as tolerated [Risks, benefits, alternatives discussed / Verbal consent obtained] : the risks benefits, and alternatives have been discussed, and verbal consent was obtained

## 2022-11-28 NOTE — HISTORY OF PRESENT ILLNESS
[de-identified] : c/o LT knee pain. States she can't walk with out limping. States no injury. Patient has had, steroid with no relief, in past. Patient is taking Advil.

## 2022-11-28 NOTE — PHYSICAL EXAM
[4___] : hamstring 4[unfilled]/5 [Equivocal] : equivocal Olivier [Left] : left knee [AP] : anteroposterior [Lateral] : lateral [[place]] : skyline [There are no fractures, subluxations or dislocations. No significant abnormalities are seen] : There are no fractures, subluxations or dislocations. No significant abnormalities are seen [Degenerative change] : Degenerative change [] : no erythema [TWNoteComboBox7] : flexion 70 degrees [de-identified] : extension 30 degrees

## 2022-12-05 ENCOUNTER — APPOINTMENT (OUTPATIENT)
Dept: ORTHOPEDIC SURGERY | Facility: CLINIC | Age: 82
End: 2022-12-05

## 2022-12-31 ENCOUNTER — EMERGENCY (EMERGENCY)
Facility: HOSPITAL | Age: 82
LOS: 1 days | Discharge: HOME | End: 2022-12-31
Attending: EMERGENCY MEDICINE | Admitting: EMERGENCY MEDICINE
Payer: MEDICARE

## 2022-12-31 VITALS
RESPIRATION RATE: 16 BRPM | HEIGHT: 61 IN | DIASTOLIC BLOOD PRESSURE: 102 MMHG | WEIGHT: 179.9 LBS | SYSTOLIC BLOOD PRESSURE: 166 MMHG | HEART RATE: 83 BPM | TEMPERATURE: 98 F

## 2022-12-31 VITALS
DIASTOLIC BLOOD PRESSURE: 90 MMHG | HEART RATE: 64 BPM | RESPIRATION RATE: 17 BRPM | SYSTOLIC BLOOD PRESSURE: 158 MMHG | TEMPERATURE: 99 F | OXYGEN SATURATION: 97 %

## 2022-12-31 DIAGNOSIS — Z98.890 OTHER SPECIFIED POSTPROCEDURAL STATES: Chronic | ICD-10-CM

## 2022-12-31 PROCEDURE — 99284 EMERGENCY DEPT VISIT MOD MDM: CPT

## 2022-12-31 PROCEDURE — 76882 US LMTD JT/FCL EVL NVASC XTR: CPT | Mod: 26,LT

## 2022-12-31 NOTE — ED PROVIDER NOTE - CLINICAL SUMMARY MEDICAL DECISION MAKING FREE TEXT BOX
ED evaluation and management discussed with the patient and family (if available) in detail.  Close PMD follow up encouraged.  Strict ED return instructions discussed in detail and patient given the opportunity to ask any questions about their discharge diagnosis and instructions. Patient verbalized understanding.    US report discussed with the patient and her wife all questions answered

## 2022-12-31 NOTE — ED PROVIDER NOTE - SKIN, MLM
Skin normal color for race, warm, dry and intact. + approx 7cm x 7cm soft tissue mass located left anterior lateral and inferior thigh non tender no erythema no increased warmth no induration

## 2022-12-31 NOTE — ED PROVIDER NOTE - OBJECTIVE STATEMENT
Triage VS: HR: 83, BP: 166/102, Resp.: 16, Temp.: 98F  Triage Note: left thigh and knee pain s/p getting off the toilet yesterday Triage VS: HR: 83, BP: 166/102, Resp.: 16, Temp.: 98F  Triage Note: left thigh and knee pain s/p getting off the toilet yesterday  -  pleasant 84 yo woman presents to the ER with her wife - stating  she has a soft tissue mass on her upper left lateral thigh x 15 years - this morning when patient stood up from a seated position the mass moved to the left lower lateral aspect of her thigh. no prior medical evaluation of the mass.   including today,  the mass is non tender not warm not red not swollen not firm. the mass has not changed size     no weight loss no cp no sob no abd pain

## 2022-12-31 NOTE — ED PROVIDER NOTE - NSFOLLOWUPINSTRUCTIONS_ED_ALL_ED_FT
follow up with your doctor next week    take Ultrasound CD and report with you    return to ER for any concern

## 2022-12-31 NOTE — ED PROVIDER NOTE - PATIENT PORTAL LINK FT
You can access the FollowMyHealth Patient Portal offered by [facility] by registering at the following website: [email or web address]. By joining [employer]’s FollowMyHealth portal, you will also be able to view your health information using other applications (apps) compatible with our system.

## 2023-01-03 ENCOUNTER — APPOINTMENT (OUTPATIENT)
Dept: ORTHOPEDIC SURGERY | Facility: CLINIC | Age: 83
End: 2023-01-03

## 2023-01-04 DIAGNOSIS — Z88.1 ALLERGY STATUS TO OTHER ANTIBIOTIC AGENTS STATUS: ICD-10-CM

## 2023-01-04 DIAGNOSIS — Z91.018 ALLERGY TO OTHER FOODS: ICD-10-CM

## 2023-01-04 DIAGNOSIS — M25.562 PAIN IN LEFT KNEE: ICD-10-CM

## 2023-01-04 DIAGNOSIS — M79.89 OTHER SPECIFIED SOFT TISSUE DISORDERS: ICD-10-CM

## 2023-01-18 ENCOUNTER — APPOINTMENT (OUTPATIENT)
Dept: ORTHOPEDIC SURGERY | Facility: CLINIC | Age: 83
End: 2023-01-18
Payer: MEDICARE

## 2023-01-18 DIAGNOSIS — D17.24 BENIGN LIPOMATOUS NEOPLASM OF SKIN AND SUBCUTANEOUS TISSUE OF LEFT LEG: ICD-10-CM

## 2023-01-18 PROCEDURE — 99204 OFFICE O/P NEW MOD 45 MIN: CPT

## 2023-01-18 RX ORDER — OXYCODONE AND ACETAMINOPHEN 5; 325 MG/1; MG/1
5-325 TABLET ORAL
Qty: 20 | Refills: 0 | Status: ACTIVE | COMMUNITY
Start: 2023-01-18 | End: 1900-01-01

## 2023-01-18 RX ORDER — CELECOXIB 200 MG/1
200 CAPSULE ORAL
Qty: 90 | Refills: 1 | Status: ACTIVE | COMMUNITY
Start: 2023-01-18 | End: 1900-01-01

## 2023-01-21 ENCOUNTER — APPOINTMENT (OUTPATIENT)
Dept: MRI IMAGING | Facility: CLINIC | Age: 83
End: 2023-01-21

## 2023-02-03 ENCOUNTER — APPOINTMENT (OUTPATIENT)
Dept: ORTHOPEDIC SURGERY | Facility: CLINIC | Age: 83
End: 2023-02-03

## 2023-04-07 NOTE — ED PROVIDER NOTE - NS ED MD DISPO DISCHARGE
VITAL SIGNS: I have reviewed nursing notes and confirm.  CONSTITUTIONAL: well-appearing, non-toxic, NAD  SKIN: Warm dry, normal skin turgor  HEAD: NCAT  EYES: EOMI, PERRLA, no scleral icterus  ENT: Moist mucous membranes, normal pharynx with no erythema or exudates  NECK: Supple; non tender. Full ROM. No cervical LAD  CARD: RRR, no murmurs, rubs or gallops  RESP: clear to ausculation b/l.  No rales, rhonchi, or wheezing.  ABD: soft, + BS, non-tender, non-distended, no rebound or guarding. No CVA tenderness  EXT: Full ROM, no bony tenderness, no pedal edema, no calf tenderness  NEURO: normal motor. normal sensory. CN II-XII intact. Cerebellar testing normal. Normal gait.  PSYCH: Cooperative, appropriate. AxOx4, no SI/HI, auditory or visual hallucinations. Home

## 2023-05-22 ENCOUNTER — APPOINTMENT (OUTPATIENT)
Dept: VASCULAR SURGERY | Facility: CLINIC | Age: 83
End: 2023-05-22
Payer: MEDICARE

## 2023-05-22 VITALS
WEIGHT: 185 LBS | OXYGEN SATURATION: 96 % | HEIGHT: 61 IN | SYSTOLIC BLOOD PRESSURE: 141 MMHG | HEART RATE: 65 BPM | DIASTOLIC BLOOD PRESSURE: 70 MMHG | BODY MASS INDEX: 34.93 KG/M2 | TEMPERATURE: 97.6 F

## 2023-05-22 DIAGNOSIS — M79.89 OTHER SPECIFIED SOFT TISSUE DISORDERS: ICD-10-CM

## 2023-05-22 DIAGNOSIS — R25.2 CRAMP AND SPASM: ICD-10-CM

## 2023-05-22 DIAGNOSIS — I83.813 VARICOSE VEINS OF BILATERAL LOWER EXTREMITIES WITH PAIN: ICD-10-CM

## 2023-05-22 DIAGNOSIS — I83.893 VARICOSE VEINS OF BILATERAL LOWER EXTREMITIES WITH OTHER COMPLICATIONS: ICD-10-CM

## 2023-05-22 PROCEDURE — 99213 OFFICE O/P EST LOW 20 MIN: CPT | Mod: 25

## 2023-05-22 PROCEDURE — 93970 EXTREMITY STUDY: CPT

## 2023-05-22 NOTE — PHYSICAL EXAM
[JVD] : no jugular venous distention  [2+] : left 2+ [Ankle Swelling (On Exam)] : present [Ankle Swelling Bilaterally] : bilaterally  [Varicose Veins Of Lower Extremities] : bilaterally [Ankle Swelling On The Left] : moderate [] : bilaterally [Ankle Swelling On The Right] : mild [No Rash or Lesion] : No rash or lesion [Purpura] : no purpura  [Petechiae] : no petechiae [Skin Ulcer] : no ulcer [Skin Induration] : no induration [Alert] : alert [Oriented to Person] : oriented to person [Oriented to Place] : oriented to place [Oriented to Time] : oriented to time [Calm] : calm [de-identified] : wdwn nad [de-identified] : wnl [de-identified] : maryannel [FreeTextEntry1] : b/l leg and calf large dilated branch veins, no Homin's sign [de-identified] : wnl [de-identified] : as above

## 2023-05-22 NOTE — ASSESSMENT
[FreeTextEntry1] : No evidence rt leg DVT, no evidence truncal reflux, previously ablated veins remain closed\par \par Follow-up with Orthopedics

## 2023-07-27 ENCOUNTER — INPATIENT (INPATIENT)
Facility: HOSPITAL | Age: 83
LOS: 0 days | Discharge: HOME | DRG: 177 | End: 2023-07-28
Attending: HOSPITALIST | Admitting: INTERNAL MEDICINE
Payer: COMMERCIAL

## 2023-07-27 VITALS
RESPIRATION RATE: 18 BRPM | HEART RATE: 51 BPM | DIASTOLIC BLOOD PRESSURE: 60 MMHG | TEMPERATURE: 99 F | OXYGEN SATURATION: 93 % | WEIGHT: 175.05 LBS | SYSTOLIC BLOOD PRESSURE: 129 MMHG

## 2023-07-27 DIAGNOSIS — Z98.890 OTHER SPECIFIED POSTPROCEDURAL STATES: Chronic | ICD-10-CM

## 2023-07-27 LAB
ALBUMIN SERPL ELPH-MCNC: 3.3 G/DL — LOW (ref 3.4–5)
ALP SERPL-CCNC: 89 U/L — SIGNIFICANT CHANGE UP (ref 40–120)
ALT FLD-CCNC: 32 U/L — SIGNIFICANT CHANGE UP (ref 12–42)
ANION GAP SERPL CALC-SCNC: 14 MMOL/L — SIGNIFICANT CHANGE UP (ref 9–16)
APTT BLD: 34.9 SEC — SIGNIFICANT CHANGE UP (ref 24.5–35.6)
AST SERPL-CCNC: 32 U/L — SIGNIFICANT CHANGE UP (ref 15–37)
BASOPHILS # BLD AUTO: 0 K/UL — SIGNIFICANT CHANGE UP (ref 0–0.2)
BASOPHILS NFR BLD AUTO: 0 % — SIGNIFICANT CHANGE UP (ref 0–2)
BILIRUB SERPL-MCNC: 0.7 MG/DL — SIGNIFICANT CHANGE UP (ref 0.2–1.2)
BUN SERPL-MCNC: 13 MG/DL — SIGNIFICANT CHANGE UP (ref 7–23)
CALCIUM SERPL-MCNC: 8.6 MG/DL — SIGNIFICANT CHANGE UP (ref 8.5–10.5)
CHLORIDE SERPL-SCNC: 104 MMOL/L — SIGNIFICANT CHANGE UP (ref 96–108)
CO2 SERPL-SCNC: 21 MMOL/L — LOW (ref 22–31)
CREAT SERPL-MCNC: 1.01 MG/DL — SIGNIFICANT CHANGE UP (ref 0.5–1.3)
EGFR: 55 ML/MIN/1.73M2 — LOW
EOSINOPHIL # BLD AUTO: 0 K/UL — SIGNIFICANT CHANGE UP (ref 0–0.5)
EOSINOPHIL NFR BLD AUTO: 0 % — SIGNIFICANT CHANGE UP (ref 0–6)
GLUCOSE SERPL-MCNC: 189 MG/DL — HIGH (ref 70–99)
HCT VFR BLD CALC: 40.7 % — SIGNIFICANT CHANGE UP (ref 34.5–45)
HGB BLD-MCNC: 13.1 G/DL — SIGNIFICANT CHANGE UP (ref 11.5–15.5)
INR BLD: 1.15 — SIGNIFICANT CHANGE UP (ref 0.85–1.18)
LACTATE BLDV-MCNC: 1.4 MMOL/L — SIGNIFICANT CHANGE UP (ref 0.5–2)
LIDOCAIN IGE QN: 52 U/L — LOW (ref 73–393)
LYMPHOCYTES # BLD AUTO: 2.38 K/UL — SIGNIFICANT CHANGE UP (ref 1–3.3)
LYMPHOCYTES # BLD AUTO: 51 % — HIGH (ref 13–44)
MAGNESIUM SERPL-MCNC: 1.9 MG/DL — SIGNIFICANT CHANGE UP (ref 1.6–2.6)
MANUAL SMEAR VERIFICATION: SIGNIFICANT CHANGE UP
MCHC RBC-ENTMCNC: 29.8 PG — SIGNIFICANT CHANGE UP (ref 27–34)
MCHC RBC-ENTMCNC: 32.2 GM/DL — SIGNIFICANT CHANGE UP (ref 32–36)
MCV RBC AUTO: 92.7 FL — SIGNIFICANT CHANGE UP (ref 80–100)
MONOCYTES # BLD AUTO: 0.19 K/UL — SIGNIFICANT CHANGE UP (ref 0–0.9)
MONOCYTES NFR BLD AUTO: 4 % — SIGNIFICANT CHANGE UP (ref 2–14)
NEUTROPHILS # BLD AUTO: 2.05 K/UL — SIGNIFICANT CHANGE UP (ref 1.8–7.4)
NEUTROPHILS NFR BLD AUTO: 44 % — SIGNIFICANT CHANGE UP (ref 43–77)
NRBC # BLD: 0 /100 — SIGNIFICANT CHANGE UP (ref 0–0)
NRBC # BLD: SIGNIFICANT CHANGE UP /100 WBCS (ref 0–0)
NT-PROBNP SERPL-SCNC: 380 PG/ML — HIGH
PCO2 BLDV: 33 MMHG — LOW (ref 39–42)
PH BLDV: 7.45 — HIGH (ref 7.32–7.43)
PLAT MORPH BLD: NORMAL — SIGNIFICANT CHANGE UP
PLATELET # BLD AUTO: 157 K/UL — SIGNIFICANT CHANGE UP (ref 150–400)
PO2 BLDV: 63 MMHG — HIGH (ref 25–45)
POTASSIUM SERPL-MCNC: 3.5 MMOL/L — SIGNIFICANT CHANGE UP (ref 3.5–5.3)
POTASSIUM SERPL-SCNC: 3.5 MMOL/L — SIGNIFICANT CHANGE UP (ref 3.5–5.3)
PROT SERPL-MCNC: 6.8 G/DL — SIGNIFICANT CHANGE UP (ref 6.4–8.2)
PROTHROM AB SERPL-ACNC: 12.6 SEC — SIGNIFICANT CHANGE UP (ref 9.5–13)
RBC # BLD: 4.39 M/UL — SIGNIFICANT CHANGE UP (ref 3.8–5.2)
RBC # FLD: 14.3 % — SIGNIFICANT CHANGE UP (ref 10.3–14.5)
RBC BLD AUTO: NORMAL — SIGNIFICANT CHANGE UP
SAO2 % BLDV: 94.8 % — HIGH (ref 67–88)
SARS-COV-2 RNA SPEC QL NAA+PROBE: DETECTED
SODIUM SERPL-SCNC: 139 MMOL/L — SIGNIFICANT CHANGE UP (ref 132–145)
TROPONIN I, HIGH SENSITIVITY RESULT: 26.9 NG/L — SIGNIFICANT CHANGE UP
TROPONIN I, HIGH SENSITIVITY RESULT: 27.1 NG/L — SIGNIFICANT CHANGE UP
VARIANT LYMPHS # BLD: 1 % — SIGNIFICANT CHANGE UP (ref 0–6)
WBC # BLD: 4.67 K/UL — SIGNIFICANT CHANGE UP (ref 3.8–10.5)
WBC # FLD AUTO: 4.67 K/UL — SIGNIFICANT CHANGE UP (ref 3.8–10.5)

## 2023-07-27 PROCEDURE — 71275 CT ANGIOGRAPHY CHEST: CPT | Mod: 26,MH

## 2023-07-27 PROCEDURE — 99223 1ST HOSP IP/OBS HIGH 75: CPT

## 2023-07-27 PROCEDURE — 99285 EMERGENCY DEPT VISIT HI MDM: CPT | Mod: FS

## 2023-07-27 RX ORDER — GABAPENTIN 400 MG/1
300 CAPSULE ORAL ONCE
Refills: 0 | Status: COMPLETED | OUTPATIENT
Start: 2023-07-27 | End: 2023-07-27

## 2023-07-27 RX ORDER — LANOLIN ALCOHOL/MO/W.PET/CERES
3 CREAM (GRAM) TOPICAL AT BEDTIME
Refills: 0 | Status: DISCONTINUED | OUTPATIENT
Start: 2023-07-27 | End: 2023-07-28

## 2023-07-27 RX ORDER — ACETAMINOPHEN 500 MG
650 TABLET ORAL EVERY 6 HOURS
Refills: 0 | Status: DISCONTINUED | OUTPATIENT
Start: 2023-07-27 | End: 2023-07-28

## 2023-07-27 RX ADMIN — GABAPENTIN 300 MILLIGRAM(S): 400 CAPSULE ORAL at 19:16

## 2023-07-27 NOTE — ED PROVIDER NOTE - NS ED ATTENDING STATEMENT MOD
This was a shared visit with the LEVON. I reviewed and verified the documentation and independently performed the documented:

## 2023-07-27 NOTE — H&P ADULT - NSHPPHYSICALEXAM_GEN_ALL_CORE
.  VITAL SIGNS:  T(C): 36.4 (07-27-23 @ 21:24), Max: 37.4 (07-27-23 @ 09:47)  T(F): 97.5 (07-27-23 @ 21:24), Max: 99.4 (07-27-23 @ 09:47)  HR: 58 (07-27-23 @ 21:24) (51 - 93)  BP: 179/91 (07-27-23 @ 21:24) (115/82 - 186/84)  BP(mean): --  RR: 18 (07-27-23 @ 21:24) (18 - 20)  SpO2: 92% (07-27-23 @ 21:24) (92% - 96%)  Wt(kg): --    PHYSICAL EXAM:    Constitutional: WDWN resting comfortably in bed; NAD  Eyes: PERRL, EOMI, anicteric sclera  ENT: no nasal discharge; uvula midline, no oropharyngeal erythema or exudates; MMM  Neck: supple; no JVD or thyromegaly  Respiratory: CTA B/L; no W/R/R, no retractions  Cardiac: +S1/S2; RRR; no M/R/G; PMI non-displaced  Gastrointestinal: soft, NT/ND; no rebound or guarding; +BSx4  Genitourinary: normal external genitalia  Back: spine midline, no bony tenderness or step-offs; no CVAT B/L  Extremities: WWP, no clubbing or cyanosis; no peripheral edema  Musculoskeletal: NROM x4; no joint swelling, tenderness or erythema  Vascular: 2+ radial, femoral, PT pulses B/L  Dermatologic: skin warm, dry and intact; no rashes, wounds, or scars  Lymphatic: no submandibular or cervical LAD  Neurologic: AAOx3; CNII-XII grossly intact; no focal deficits  Psychiatric: affect and characteristics of appearance, verbalizations, behaviors are appropriate

## 2023-07-27 NOTE — ED ADULT TRIAGE NOTE - INTERNATIONAL TRAVEL
----- Message from Kylie Grier sent at 3/10/2022  9:46 AM CST -----  Regarding: pain  Name of Who is Calling: Vale           What is the request in detail: Patient is requesting a call back in regard to the pain that she is in. She want to know if it's something she can take.           Can the clinic reply by [facility]: No           What Number to Call Back if not in [facility]: [phone]    
Unable to Assess

## 2023-07-27 NOTE — ED PROVIDER NOTE - OBJECTIVE STATEMENT
82 y/o female denies hx now here with SOB,fatigue, and a syncopal episode this morning in the setting of COVID. Patient dx with covid two days ago and was placed on paxlovid. Patient states she has been very fatigued the last few days. This morning patient was attempting to use the bathroom using the wall for support because she was fatigued and had a syncopal episode while in the bathroom landing in the seated position and denies headstrike. Patient also had episode of hypotension in the field. Dyspnea on arrival. Denies chest pain,nausea,vomiting,diarrhea,fevers,chills,trauma.

## 2023-07-27 NOTE — ED ADULT TRIAGE NOTE - CHIEF COMPLAINT QUOTE
pt tested positive 2 days ago for COVID, took paxlovid today and suddenly had a syncopal episode and defecated on herself. EMS reported hypotension. FS in the field 112.

## 2023-07-27 NOTE — H&P ADULT - HISTORY OF PRESENT ILLNESS
Ms. Turpin is a 82 yo female with pmhx of HTN, DM II, migraines, anxiety, arthritis, glaucoma presenting from [facility] for syncope seemingly secondary to covid. Patient and her sister at bedside state thatt they live together and that the patient's sister had covid last weekend and  Ms. Turpin is a 84 yo female with pmhx of HTN, HLD, DM II, migraines, anxiety, arthritis, glaucoma presenting from [facility] after a syncopal episode at home. Patient and her sister at bedside state that they live together and that the patient's sister had covid last weekend. Consequently the patient was found to be covid positive at home on Tuesday after a positive at home test. Her outpatient provider was contacted and prescribed her paxlovid to be taken one time in the morning and one time at night and to stop taking her clonipin, valsartan, and atorvastatin. She had a temperature on Wednesday night to 102 and took tylenol with reduction in the fever. States that on Thursday morning after eating and taking her morning paxlovid, she went back to bed secondary to lethargy but began feeling nauseous, calling her sister over. Once her sister attempted to help her get up to get to the bathroom, the patient had uncontrolled bowel movements and secondarily passed out for briefly right back into her bed. Her sister aggressively attempted to wake her up as to which the patient states that she was aware of happening and 911 was called. Currently only complains of mild abdominal pain.     In the ED:  Initial vital signs: T: 99.4 F, HR: 51, BP: 129/60, R: 18, SpO2: 93% on RA  Labs: significant for mildly elevated pro-bnp at 380  Imaging: CT angio w/ no PE, pulmonary parenchyma demonstrates bilateral lung mosaic   attenuation.  EKG: Sinus juve w/ first degree AV block  Medications: Gabapentin 300 mg x1  Consults: none

## 2023-07-27 NOTE — ED PROVIDER NOTE - CLINICAL SUMMARY MEDICAL DECISION MAKING FREE TEXT BOX
Patient here with COVID with exertional SOB, syncopal episode, episode of hypotension in the field,  Plan: CTA PE, labs, ekg, admission for COVID 19 and exertional SOB.

## 2023-07-27 NOTE — ED ADULT NURSE NOTE - OBJECTIVE STATEMENT
aox3, breathing even and unlabored at this time. syncope at home. denies hitting head. patient states she 'slid' down end of the bed. recent covid+ home test. denies SOB, or chest pain

## 2023-07-27 NOTE — H&P ADULT - NSHPREVIEWOFSYSTEMS_GEN_ALL_CORE
REVIEW OF SYSTEMS:  CONSTITUTIONAL: No weakness, fevers, or chills  EYES/ENT: No visual changes; No vertigo, throat pain, or dysphagia  NECK: No pain or stiffness  RESPIRATORY: No cough, wheezing, hemoptysis, or shortness of breath  CARDIOVASCULAR: No chest pain or palpitations  GASTROINTESTINAL: Admits to abdominal pain. No current complaints of nausea, vomiting, or hematemesis; No current complaints of diarrhea or constipation. No melena or hematochezia.  GENITOURINARY: No dysuria, frequency, or hematuria  MUSCULOSKELETAL: No joint or muscle pain or aches  NEUROLOGICAL: No numbness or weakness  SKIN: No itching or rashes [id number] Comprehensive

## 2023-07-27 NOTE — H&P ADULT - PROBLEM SELECTOR PLAN 1
Patient had syncopal episode after getting out of bed and having multiple bowel movements. Came to briefly after syncopizing. EKG obtained revealing sinus juve with first degree AV block. Most likely vasovagal vs less likely orthostatic and cardiogenic.     Plan:  - Obtain orthostatic blood pressure  - PT eval  - Monitor VS Patient had syncopal episode after getting out of bed and having multiple bowel movements. Came to briefly after syncopizing. EKG obtained revealing sinus juve with first degree AV block. Most likely vasovagal vs orthostatic vs. cardiogenic.     Plan:  - Obtain orthostatic blood pressure  - PT eval  - Monitor VS

## 2023-07-27 NOTE — H&P ADULT - ASSESSMENT
Ms. Turpin is a 82 yo female with pmhx of HTN, HLD, DM II, migraines, anxiety, arthritis, glaucoma presenting from [facility] after a syncopal episode at home, admitted for syncopal workup.

## 2023-07-27 NOTE — ED ADULT NURSE NOTE - AS PAIN REST
Pt calling stating her monitor was going off. Checked in on [facility], ECG came over. Wants to know if everything is okay.   0 (no pain/absence of nonverbal indicators of pain)

## 2023-07-27 NOTE — H&P ADULT - PROBLEM SELECTOR PLAN 8
F: None  E: Replete as needed  N: Consistent carb  DVT Proph: Lovenox subq  GI proph: None  Code Status: Full Code  Dispo : [facility]

## 2023-07-27 NOTE — H&P ADULT - PROBLEM SELECTOR PLAN 6
Patient with newly diagnosed diabetes. Began taking Jardiance, unclear about dosing at home.    Plan:  - Moderate corrective sliding scale

## 2023-07-27 NOTE — ED ADULT NURSE NOTE - NSFALLUNIVINTERV_ED_ALL_ED
Bed/Stretcher in lowest position, wheels locked, appropriate side rails in place/Call bell, personal items and telephone in reach/Instruct patient to call for assistance before getting out of bed/chair/stretcher/Non-slip footwear applied when patient is off stretcher/[place] to call system/Physically safe environment - no spills, clutter or unnecessary equipment/Purposeful proactive rounding/Room/bathroom lighting operational, light cord in reach

## 2023-07-27 NOTE — H&P ADULT - NSICDXPASTMEDICALHX_GEN_ALL_CORE_FT
PAST MEDICAL HISTORY:  Anxiety     Arthritis     Back pain     Glaucoma     HLD (hyperlipidemia)     HTN (hypertension)     Type 2 diabetes mellitus

## 2023-07-27 NOTE — H&P ADULT - NSHPLABSRESULTS_GEN_ALL_CORE
13.1   4.67  )-----------( 157      ( 27 Jul 2023 09:51 )             40.7       07-27    139  |  104  |  13  ----------------------------<  189<H>  3.5   |  21<L>  |  1.01    Ca    8.6      27 Jul 2023 09:51  Mg     1.9     07-27    TPro  6.8  /  Alb  3.3<L>  /  TBili  0.7  /  DBili  x   /  AST  32  /  ALT  32  /  AlkPhos  89  07-27              Urinalysis Basic - ( 27 Jul 2023 09:51 )    Color: x / Appearance: x / SG: x / pH: x  Gluc: 189 mg/dL / Ketone: x  / Bili: x / Urobili: x   Blood: x / Protein: x / Nitrite: x   Leuk Esterase: x / RBC: x / WBC x   Sq Epi: x / Non Sq Epi: x / Bacteria: x        PT/INR - ( 27 Jul 2023 09:51 )   PT: 12.6 sec;   INR: 1.15          PTT - ( 27 Jul 2023 09:51 )  PTT:34.9 sec    Lactate Trend            CAPILLARY BLOOD GLUCOSE      POCT Blood Glucose.: 178 mg/dL (27 Jul 2023 09:46)

## 2023-07-27 NOTE — ED PROVIDER NOTE - ATTENDING APP SHARED VISIT CONTRIBUTION OF CARE
Patient is an 83-year-old female who denies any past medical history who presents with shortness of breath, generalized weakness, syncopal episode this morning.  Patient reports a recent diagnosis of COVID.  Her partner recently just got over COVID and she tested +2 days ago.  She was started on Paxlovid.  This morning while trying to use the bathroom she fainted.  Denies head injury.  Hypotensive in field.  Short of breath upon arrival.  Review of systems as above.  Physical exam–agree with above.  Assessment and plan.  Syncope and hypotension secondary to COVID infection.  We will check labs, cardiac enzymes, EKG, and CTA of chest to rule out PE.  Will consider admission given severe state of symptoms upon arrival.  Patient is high risk for deterioration.

## 2023-07-27 NOTE — ED ADULT NURSE NOTE - CHIEF COMPLAINT QUOTE
show
pt tested positive 2 days ago for COVID, took paxlovid today and suddenly had a syncopal episode and defecated on herself. EMS reported hypotension. FS in the field 112.

## 2023-07-27 NOTE — H&P ADULT - PROBLEM SELECTOR PLAN 2
Patient was taking paxlovid 2x/day since Tuesday. Course complicated by fever and abdominal complaints.     Plan:  - Monitor symptoms and symptomatically treat Patient was taking paxlovid 2x/day since Tuesday. Course complicated by fever and abdominal complaints.     Plan:  - D/C paxlovid  - Non-hypoxic so no indication Patient was taking paxlovid 2x/day since Tuesday. Course complicated by fever and abdominal complaints.     Plan:  - D/c paxlovid  - Non-hypoxic, poor indication for steroid initiation  - Based off age and diabetic comorbidities with concern for high risk for progression of COVID-19, decision to start Remdesivir Patient was taking paxlovid 2x/day since Tuesday. Course complicated by fever and abdominal complaints.     Plan:  - D/c paxlovid  - Non-hypoxic, poor indication for steroid initiation  - Based off age and diabetic comorbidities with concern for high risk for progression of COVID-19, decision to start Remdesivir  - F/u transaminases while giving remdesivir

## 2023-07-27 NOTE — H&P ADULT - PROBLEM SELECTOR PLAN 7
F: None  E: Replete as needed  N: Consistent carb  DVT Proph: Lovenox subq  GI proph: None  Code Status: Full Code  Dispo : [facility] Patient with history of back pain. Takes gabapentin 350 mg TID mostly for migraines but has been taking it for pain.     Plan:  - C/w gabapentin 350 mg TID Patient with history of back pain. Takes gabapentin 350 mg TID mostly for migraines but has been taking it for pain.     Plan:  - C/w gabapentin 300 mg TID

## 2023-07-27 NOTE — PATIENT PROFILE ADULT - FALL HARM RISK - HARM RISK INTERVENTIONS

## 2023-07-27 NOTE — H&P ADULT - PROBLEM SELECTOR PLAN 3
Patient with history of elevated blood pressure. Home medications include Amlodipine 5 mg qd, valsartan 80 mg qd, klonipin .5 mg qd. Stopped taking valsartan and klonipin .5 mg qd while taking paxlovid and decreased amlodipine to 2.5 mg as well.     Plan:  - C/w home medications in AM Patient with history of elevated blood pressure. Home medications include Amlodipine 5 mg qd, valsartan 80 mg qd, klonipin .5 mg qd. Stopped taking valsartan and klonipin .5 mg qd while taking paxlovid and decreased amlodipine to 2.5 mg as well.     Plan:  - C/w amlodipine 5 mg and valsartan 80 mg qd

## 2023-07-27 NOTE — H&P ADULT - ATTENDING COMMENTS
84 yo F with PMHx HTN, HLD, DM BIBEMS from home to [facility] for 2 day hx of COVID PNA on Paxlovid with syncopal episode admitted for management of COVID PNA.      #Syncope – Px s/p syncopal episode at home after 1d of diarrhea, now resolved. No pre-syncopal sx, denies CP/SOB/palpitations. No head trauma. CTPE in ED negative. EKG showing sinus bradycardia to HR 55 with 1st degree AV block but no ischemic changes otherwise. Cardiac enzymes unremarkable. Given hx of COVID, episode of hypotension in the field with EMS, and improvement s/p IVF likely vasovagal  vs orthostatic etiology. F/U orthostatics.      #COVID PNA – Tested positive for COVID outpatient and was on Paxlovid x2d with ongoing sx and generalized fatigue/lightheadedness. COVID PCR positive on current admission however not meeting SIRS/sepsis. Afebrile without leukocytosis and satting well on room air. Not candidate for steroids. Continue Remdesivir in high-risk patient given age and co-mobidities. Monitor LFTs while on Remdesivir.      Agree with remainder of resident plan as above.

## 2023-07-28 ENCOUNTER — TRANSCRIPTION ENCOUNTER (OUTPATIENT)
Age: 83
End: 2023-07-28

## 2023-07-28 VITALS
DIASTOLIC BLOOD PRESSURE: 77 MMHG | SYSTOLIC BLOOD PRESSURE: 165 MMHG | RESPIRATION RATE: 18 BRPM | HEART RATE: 56 BPM | OXYGEN SATURATION: 95 % | TEMPERATURE: 98 F

## 2023-07-28 DIAGNOSIS — E78.5 HYPERLIPIDEMIA, UNSPECIFIED: ICD-10-CM

## 2023-07-28 DIAGNOSIS — U07.1 COVID-19: ICD-10-CM

## 2023-07-28 DIAGNOSIS — I10 ESSENTIAL (PRIMARY) HYPERTENSION: ICD-10-CM

## 2023-07-28 DIAGNOSIS — E11.9 TYPE 2 DIABETES MELLITUS WITHOUT COMPLICATIONS: ICD-10-CM

## 2023-07-28 DIAGNOSIS — M54.9 DORSALGIA, UNSPECIFIED: ICD-10-CM

## 2023-07-28 DIAGNOSIS — I44.0 ATRIOVENTRICULAR BLOCK, FIRST DEGREE: ICD-10-CM

## 2023-07-28 DIAGNOSIS — Z29.9 ENCOUNTER FOR PROPHYLACTIC MEASURES, UNSPECIFIED: ICD-10-CM

## 2023-07-28 DIAGNOSIS — R55 SYNCOPE AND COLLAPSE: ICD-10-CM

## 2023-07-28 DIAGNOSIS — F41.9 ANXIETY DISORDER, UNSPECIFIED: ICD-10-CM

## 2023-07-28 LAB
ALBUMIN SERPL ELPH-MCNC: 3.6 G/DL — SIGNIFICANT CHANGE UP (ref 3.3–5)
ALP SERPL-CCNC: 79 U/L — SIGNIFICANT CHANGE UP (ref 40–120)
ALT FLD-CCNC: 21 U/L — SIGNIFICANT CHANGE UP (ref 10–45)
ANION GAP SERPL CALC-SCNC: 12 MMOL/L — SIGNIFICANT CHANGE UP (ref 5–17)
AST SERPL-CCNC: 24 U/L — SIGNIFICANT CHANGE UP (ref 10–40)
BILIRUB SERPL-MCNC: 0.2 MG/DL — SIGNIFICANT CHANGE UP (ref 0.2–1.2)
BUN SERPL-MCNC: 12 MG/DL — SIGNIFICANT CHANGE UP (ref 7–23)
CALCIUM SERPL-MCNC: 7.9 MG/DL — LOW (ref 8.4–10.5)
CHLORIDE SERPL-SCNC: 107 MMOL/L — SIGNIFICANT CHANGE UP (ref 96–108)
CO2 SERPL-SCNC: 23 MMOL/L — SIGNIFICANT CHANGE UP (ref 22–31)
CREAT SERPL-MCNC: 0.83 MG/DL — SIGNIFICANT CHANGE UP (ref 0.5–1.3)
EGFR: 70 ML/MIN/1.73M2 — SIGNIFICANT CHANGE UP
GLUCOSE BLDC GLUCOMTR-MCNC: 114 MG/DL — HIGH (ref 70–99)
GLUCOSE SERPL-MCNC: 105 MG/DL — HIGH (ref 70–99)
HCT VFR BLD CALC: 40 % — SIGNIFICANT CHANGE UP (ref 34.5–45)
HGB BLD-MCNC: 12.9 G/DL — SIGNIFICANT CHANGE UP (ref 11.5–15.5)
MAGNESIUM SERPL-MCNC: 1.9 MG/DL — SIGNIFICANT CHANGE UP (ref 1.6–2.6)
MCHC RBC-ENTMCNC: 30.1 PG — SIGNIFICANT CHANGE UP (ref 27–34)
MCHC RBC-ENTMCNC: 32.3 GM/DL — SIGNIFICANT CHANGE UP (ref 32–36)
MCV RBC AUTO: 93.2 FL — SIGNIFICANT CHANGE UP (ref 80–100)
NRBC # BLD: 0 /100 WBCS — SIGNIFICANT CHANGE UP (ref 0–0)
PHOSPHATE SERPL-MCNC: 2.8 MG/DL — SIGNIFICANT CHANGE UP (ref 2.5–4.5)
PLATELET # BLD AUTO: 134 K/UL — LOW (ref 150–400)
POTASSIUM SERPL-MCNC: 3.7 MMOL/L — SIGNIFICANT CHANGE UP (ref 3.5–5.3)
POTASSIUM SERPL-SCNC: 3.7 MMOL/L — SIGNIFICANT CHANGE UP (ref 3.5–5.3)
PROT SERPL-MCNC: 6.2 G/DL — SIGNIFICANT CHANGE UP (ref 6–8.3)
RBC # BLD: 4.29 M/UL — SIGNIFICANT CHANGE UP (ref 3.8–5.2)
RBC # FLD: 14.4 % — SIGNIFICANT CHANGE UP (ref 10.3–14.5)
SODIUM SERPL-SCNC: 142 MMOL/L — SIGNIFICANT CHANGE UP (ref 135–145)
WBC # BLD: 3.2 K/UL — LOW (ref 3.8–10.5)
WBC # FLD AUTO: 3.2 K/UL — LOW (ref 3.8–10.5)

## 2023-07-28 PROCEDURE — 71275 CT ANGIOGRAPHY CHEST: CPT

## 2023-07-28 PROCEDURE — 80053 COMPREHEN METABOLIC PANEL: CPT

## 2023-07-28 PROCEDURE — 97161 PT EVAL LOW COMPLEX 20 MIN: CPT

## 2023-07-28 PROCEDURE — 93005 ELECTROCARDIOGRAM TRACING: CPT

## 2023-07-28 PROCEDURE — 85730 THROMBOPLASTIN TIME PARTIAL: CPT

## 2023-07-28 PROCEDURE — 82962 GLUCOSE BLOOD TEST: CPT

## 2023-07-28 PROCEDURE — 83690 ASSAY OF LIPASE: CPT

## 2023-07-28 PROCEDURE — 85027 COMPLETE CBC AUTOMATED: CPT

## 2023-07-28 PROCEDURE — 99285 EMERGENCY DEPT VISIT HI MDM: CPT

## 2023-07-28 PROCEDURE — 84100 ASSAY OF PHOSPHORUS: CPT

## 2023-07-28 PROCEDURE — 82803 BLOOD GASES ANY COMBINATION: CPT

## 2023-07-28 PROCEDURE — 99239 HOSP IP/OBS DSCHRG MGMT >30: CPT

## 2023-07-28 PROCEDURE — 96365 THER/PROPH/DIAG IV INF INIT: CPT

## 2023-07-28 PROCEDURE — 36415 COLL VENOUS BLD VENIPUNCTURE: CPT

## 2023-07-28 PROCEDURE — 83880 ASSAY OF NATRIURETIC PEPTIDE: CPT

## 2023-07-28 PROCEDURE — 83735 ASSAY OF MAGNESIUM: CPT

## 2023-07-28 PROCEDURE — 85025 COMPLETE CBC W/AUTO DIFF WBC: CPT

## 2023-07-28 PROCEDURE — 84484 ASSAY OF TROPONIN QUANT: CPT

## 2023-07-28 PROCEDURE — 83605 ASSAY OF LACTIC ACID: CPT

## 2023-07-28 PROCEDURE — 85610 PROTHROMBIN TIME: CPT

## 2023-07-28 PROCEDURE — 87635 SARS-COV-2 COVID-19 AMP PRB: CPT

## 2023-07-28 RX ORDER — GLUCAGON INJECTION, SOLUTION 0.5 MG/.1ML
1 INJECTION, SOLUTION SUBCUTANEOUS ONCE
Refills: 0 | Status: DISCONTINUED | OUTPATIENT
Start: 2023-07-28 | End: 2023-07-28

## 2023-07-28 RX ORDER — AMLODIPINE BESYLATE 2.5 MG/1
5 TABLET ORAL DAILY
Refills: 0 | Status: DISCONTINUED | OUTPATIENT
Start: 2023-07-28 | End: 2023-07-28

## 2023-07-28 RX ORDER — DEXTROSE 50 % IN WATER 50 %
12.5 SYRINGE (ML) INTRAVENOUS ONCE
Refills: 0 | Status: DISCONTINUED | OUTPATIENT
Start: 2023-07-28 | End: 2023-07-28

## 2023-07-28 RX ORDER — REMDESIVIR 5 MG/ML
100 INJECTION INTRAVENOUS EVERY 24 HOURS
Refills: 0 | Status: DISCONTINUED | OUTPATIENT
Start: 2023-07-29 | End: 2023-07-28

## 2023-07-28 RX ORDER — ENOXAPARIN SODIUM 100 MG/ML
40 INJECTION SUBCUTANEOUS EVERY 24 HOURS
Refills: 0 | Status: DISCONTINUED | OUTPATIENT
Start: 2023-07-28 | End: 2023-07-28

## 2023-07-28 RX ORDER — SODIUM CHLORIDE 9 MG/ML
1000 INJECTION, SOLUTION INTRAVENOUS
Refills: 0 | Status: COMPLETED | OUTPATIENT
Start: 2023-07-28 | End: 2023-07-28

## 2023-07-28 RX ORDER — SODIUM CHLORIDE 9 MG/ML
1000 INJECTION, SOLUTION INTRAVENOUS
Refills: 0 | Status: DISCONTINUED | OUTPATIENT
Start: 2023-07-28 | End: 2023-07-28

## 2023-07-28 RX ORDER — ATORVASTATIN CALCIUM 80 MG/1
40 TABLET, FILM COATED ORAL
Qty: 0 | Refills: 0 | DISCHARGE

## 2023-07-28 RX ORDER — VALSARTAN 80 MG/1
80 TABLET ORAL DAILY
Refills: 0 | Status: DISCONTINUED | OUTPATIENT
Start: 2023-07-28 | End: 2023-07-28

## 2023-07-28 RX ORDER — ATORVASTATIN CALCIUM 80 MG/1
40 TABLET, FILM COATED ORAL AT BEDTIME
Refills: 0 | Status: DISCONTINUED | OUTPATIENT
Start: 2023-07-28 | End: 2023-07-28

## 2023-07-28 RX ORDER — REMDESIVIR 5 MG/ML
200 INJECTION INTRAVENOUS EVERY 24 HOURS
Refills: 0 | Status: COMPLETED | OUTPATIENT
Start: 2023-07-28 | End: 2023-07-28

## 2023-07-28 RX ORDER — GABAPENTIN 400 MG/1
300 CAPSULE ORAL THREE TIMES A DAY
Refills: 0 | Status: DISCONTINUED | OUTPATIENT
Start: 2023-07-28 | End: 2023-07-28

## 2023-07-28 RX ORDER — BIMATOPROST 0.3 MG/ML
0 SOLUTION/ DROPS OPHTHALMIC
Qty: 0 | Refills: 0 | DISCHARGE

## 2023-07-28 RX ORDER — ESCITALOPRAM OXALATE 10 MG/1
10 TABLET, FILM COATED ORAL
Qty: 0 | Refills: 0 | DISCHARGE

## 2023-07-28 RX ORDER — METHYLPHENIDATE HCL 5 MG
0 TABLET ORAL
Qty: 0 | Refills: 0 | DISCHARGE

## 2023-07-28 RX ORDER — INSULIN LISPRO 100/ML
VIAL (ML) SUBCUTANEOUS AT BEDTIME
Refills: 0 | Status: DISCONTINUED | OUTPATIENT
Start: 2023-07-28 | End: 2023-07-28

## 2023-07-28 RX ORDER — ALPRAZOLAM 0.25 MG
0 TABLET ORAL
Qty: 0 | Refills: 0 | DISCHARGE

## 2023-07-28 RX ORDER — LANOLIN ALCOHOL/MO/W.PET/CERES
3 CREAM (GRAM) TOPICAL ONCE
Refills: 0 | Status: COMPLETED | OUTPATIENT
Start: 2023-07-28 | End: 2023-07-28

## 2023-07-28 RX ORDER — DEXTROSE 50 % IN WATER 50 %
25 SYRINGE (ML) INTRAVENOUS ONCE
Refills: 0 | Status: DISCONTINUED | OUTPATIENT
Start: 2023-07-28 | End: 2023-07-28

## 2023-07-28 RX ORDER — TIMOLOL 0.5 %
0 DROPS OPHTHALMIC (EYE)
Qty: 0 | Refills: 0 | DISCHARGE

## 2023-07-28 RX ORDER — INSULIN LISPRO 100/ML
VIAL (ML) SUBCUTANEOUS
Refills: 0 | Status: DISCONTINUED | OUTPATIENT
Start: 2023-07-28 | End: 2023-07-28

## 2023-07-28 RX ORDER — DEXTROSE 50 % IN WATER 50 %
15 SYRINGE (ML) INTRAVENOUS ONCE
Refills: 0 | Status: DISCONTINUED | OUTPATIENT
Start: 2023-07-28 | End: 2023-07-28

## 2023-07-28 RX ORDER — ESCITALOPRAM OXALATE 10 MG/1
10 TABLET, FILM COATED ORAL DAILY
Refills: 0 | Status: DISCONTINUED | OUTPATIENT
Start: 2023-07-28 | End: 2023-07-28

## 2023-07-28 RX ORDER — VALSARTAN 80 MG/1
80 TABLET ORAL
Qty: 0 | Refills: 0 | DISCHARGE

## 2023-07-28 RX ORDER — REMDESIVIR 5 MG/ML
INJECTION INTRAVENOUS
Refills: 0 | Status: DISCONTINUED | OUTPATIENT
Start: 2023-07-28 | End: 2023-07-28

## 2023-07-28 RX ADMIN — VALSARTAN 80 MILLIGRAM(S): 80 TABLET ORAL at 06:32

## 2023-07-28 RX ADMIN — ESCITALOPRAM OXALATE 10 MILLIGRAM(S): 10 TABLET, FILM COATED ORAL at 13:32

## 2023-07-28 RX ADMIN — Medication 3 MILLIGRAM(S): at 02:45

## 2023-07-28 RX ADMIN — SODIUM CHLORIDE 1000 MILLILITER(S): 9 INJECTION, SOLUTION INTRAVENOUS at 13:33

## 2023-07-28 RX ADMIN — ENOXAPARIN SODIUM 40 MILLIGRAM(S): 100 INJECTION SUBCUTANEOUS at 13:32

## 2023-07-28 RX ADMIN — GABAPENTIN 300 MILLIGRAM(S): 400 CAPSULE ORAL at 06:32

## 2023-07-28 RX ADMIN — GABAPENTIN 300 MILLIGRAM(S): 400 CAPSULE ORAL at 13:32

## 2023-07-28 RX ADMIN — REMDESIVIR 200 MILLIGRAM(S): 5 INJECTION INTRAVENOUS at 06:32

## 2023-07-28 NOTE — PROGRESS NOTE ADULT - PROBLEM SELECTOR PLAN 8
Patient with history of back pain. Takes gabapentin 350 mg TID mostly for migraines but has been taking it for pain.     Plan:  - C/w gabapentin 300 mg TID. Prophylactic measure.   ·  Plan: F: LR 1000 mL  E: Replete as needed  N: Consistent carb  DVT Proph: Lovenox subq  GI proph: None  Code Status: Full Code  Dispo : [facility].

## 2023-07-28 NOTE — DISCHARGE NOTE NURSING/CASE MANAGEMENT/SOCIAL WORK - PATIENT PORTAL LINK FT
You can access the FollowMyHealth Patient Portal offered by [facility] by registering at the following website: [email or web address]. By joining [facility]’s FollowMyHealth portal, you will also be able to view your health information using other applications (apps) compatible with our system.

## 2023-07-28 NOTE — PROGRESS NOTE ADULT - PROBLEM SELECTOR PLAN 6
Patient with history of HLD, taking atorvastatin 40 mg qhs.     Plan:  - C/w home medication. -Patient with history of elevated blood pressure. Home medications include Amlodipine 5 mg qd, valsartan 80 mg qd. Stopped taking valsartan and klonipin .5 mg qd while taking paxlovid and decreased amlodipine to 2.5 mg as well.   -Blood pressure 165/75    Plan:  - C/w amlodipine 5 mg and valsartan 80 mg qd.

## 2023-07-28 NOTE — PROGRESS NOTE ADULT - ASSESSMENT
Ms. Turpin is a 84 yo female with pmhx of HTN, HLD, DM II, migraines, anxiety, arthritis, glaucoma presenting from [facility] after a syncopal episode at home, admitted for syncope workup.

## 2023-07-28 NOTE — DISCHARGE NOTE PROVIDER - NSDCMRMEDTOKEN_GEN_ALL_CORE_FT
atorvastatin:   cefuroxime 250 mg oral tablet: 1 tab(s) orally every 12 hours   Lexapro:   Lumigan:   methocarbamol 500 mg oral tablet: 2 tab(s) orally 4 times a day   Norvasc 5 mg oral tablet: 1 tab(s) orally once a day  Percocet 5/325 oral tablet: 1 tab(s) orally every 6 hours MDD:4  Ritalin:   Timoptic:   valsartan:   Xanax:    atorvastatin:   Lexapro:   Norvasc 5 mg oral tablet: 1 tab(s) orally once a day  valsartan:

## 2023-07-28 NOTE — DISCHARGE NOTE PROVIDER - CARE PROVIDER_API CALL
Angela Subramanian  Internal Medicine  [address]  [place] [address]  Phone: [phone]  Fax: [phone]  Scheduled Appointment: 07/31/2023 12:45 PM

## 2023-07-28 NOTE — CONSULT NOTE ADULT - ASSESSMENT
I M    83 y o female with pmhx of HTN, HLD, DM II, migraines, anxiety, arthritis, glaucoma presenting from [facility] after a syncopal episode at home, admitted for syncopal workup.     Problem/Plan - 1:  ·  Problem: Syncope.   ·  Plan: Patient had syncopal episode after getting out of bed and having multiple bowel movements. Came to briefly after syncopizing. EKG obtained revealing sinus juve with first degree AV block. Most likely vasovagal vs orthostatic vs. cardiogenic.     Plan:  - Obtain orthostatic blood pressure  - PT eval  - Monitor VS.    Problem/Plan - 2:  ·  Problem: Pneumonia due to COVID-19 virus.   ·  Plan: Patient was taking paxlovid 2x/day since Tuesday. Course complicated by fever and abdominal complaints.     Plan:  - D/c paxlovid  - Non-hypoxic, poor indication for steroid initiation  - Based off age and diabetic comorbidities with concern for high risk for progression of COVID-19, decision to start Remdesivir  - F/u transaminases while giving remdesivir.    Problem/Plan - 3:  ·  Problem: HTN (hypertension).   ·  Plan: Patient with history of elevated blood pressure. Home medications include Amlodipine 5 mg qd, valsartan 80 mg qd, klonipin .5 mg qd. Stopped taking valsartan and klonipin .5 mg qd while taking paxlovid and decreased amlodipine to 2.5 mg as well.     Plan:  - C/w amlodipine 5 mg and valsartan 80 mg qd.    Problem/Plan - 4:  ·  Problem: HLD (hyperlipidemia).   ·  Plan: Patient with history of HLD, taking atorvastatin 40 mg qhs.     Plan:  - C/w home medication.    Problem/Plan - 5:  ·  Problem: Anxiety.   ·  Plan: Patient with history of anxiety, takes lexapro 10 mg qd.     Plan:  - C/w above medication.    Problem/Plan - 6:  ·  Problem: Type 2 diabetes mellitus.   ·  Plan: Patient with newly diagnosed diabetes. Began taking Jardiance, unclear about dosing at home.    Plan:  - Moderate corrective sliding scale.    Problem/Plan - 7:  ·  Problem: Back pain.   ·  Plan: Patient with history of back pain. Takes gabapentin 350 mg TID mostly for migraines but has been taking it for pain.     Plan:  - C/w gabapentin 300 mg TID.    Problem/Plan - 8:  ·  Problem: Prophylactic measure.   ·  Plan: F: None  E: Replete as needed  N: Consistent carb  DVT Proph: Lovenox subq  GI proph: None  Code Status: Full Code  Dispo : [facility].

## 2023-07-28 NOTE — PHYSICAL THERAPY INITIAL EVALUATION ADULT - PERTINENT HX OF CURRENT PROBLEM, REHAB EVAL
Ms. Turpin is a 82 yo female with pmhx of HTN, HLD, DM II, migraines, anxiety, arthritis, glaucoma presenting from [facility] after a syncopal episode at home, admitted for syncopal workup.

## 2023-07-28 NOTE — PROGRESS NOTE ADULT - PROBLEM SELECTOR PLAN 7
Prophylactic measure.   ·  Plan: F: LR 1000 mL  E: Replete as needed  N: Consistent carb  DVT Proph: Lovenox subq  GI proph: None  Code Status: Full Code  Dispo : [facility]. Patient with history of HLD, taking atorvastatin 40 mg qhs.     Plan:  - C/w home medication.

## 2023-07-28 NOTE — PHYSICAL THERAPY INITIAL EVALUATION ADULT - LEVEL OF INDEPENDENCE: SIT/STAND, REHAB EVAL
February 15, 2023      Linda Bobo DO  [address]     RE:  Leola Gonzalez  : 2001   AGE: 24 y.o. This report has been created using voice recognition software. It may contain errors which are inherent in voice recognition technology. Dear Dr. Rashel Rosenberg:      I had the pleasure of meeting with Ms. Cheyanne Jefferson for a return consultation. As you know, Ms. Cheyanne Jefferson  is a 24 y.o.  at 29w1d (9 hen[address] 131) who is being followed by our office for multiple medical issues. Today, Ms. Cheyanne Jefferson reports that she feels well. She notes good fetal movement and denies any symptoms of leaking of fluid, vaginal bleeding, and/or contractions. She had a fetal ultrasound that was notable for the following. There is a single intrauterine gestation in a cephalic presentation with a heart rate of 137 beats per minute. The placenta is anterior. The amniotic fluid index is 13.5 cm. The composite gestational age is 31w0d. The estimated fetal weight is at the 49th percentile. BPP 8/8. Umbilical artery Doppler studies are elevated. End-diastolic flow was seen. MCA PI normal.  MCA PSV normal.  CPR PI >1. Transabdominal cervical length 4.5 cm. PERTINENT PHYSICAL EXAMINATION:   /83   Pulse 88   Wt 266 lb 6.4 oz (120.8 kg)   LMP 2022   BMI 41.11 kg/m²     Urine dipstick:   Negative for Glucose    Negative for Albumin      A fetal ultrasound assessment was performed today. A report is enclosed for your review. Assessment & Plan:  24 y.o.  at 28w1d ([facility]) with:    1. Genetic counseling -- The patient was previously counseled regarding her options for genetic screening and/or diagnostic testing. Counseling was reviewed. The patient did not have any additional questions or concerns. The patient's options for genetic screening and/or diagnostic testing were reviewed including a quad screen, NIPT, and or amniocentesis.   The risks and benefits of each were discussed. After this discussion, the patient indicated that she was undecided. A handout reviewing a panorama was provided. -- The patient declined genetic screening     The patient's prenatal record was reviewed. She had screening for cystic fibrosis that was negative. She was also counseled regarding the recommendations for screening for spinal muscular atrophy and Fragile X. The risks and benefits of screening were reviewed. After this discussion, she was undecided. She was provided a handout regarding a [employer] panel. -- The patient declined carrier screening        2. Obesity in pregnancy -- The patient reports that she is 5 foot 8 inches tall. She reports that her weight has remained stable. She was noted to weigh 260 pounds at 19 weeks 6 days. She weighed 266 pounds today. She has gained 1 pound since her last visit. Her BMI is 41. 11. Given the increased risks previously described, additional testing is recommended. Fetal growth should be monitored every 3-4 weeks starting at 24-26 weeks' gestation. Fetal growth was appropriate for the gestational age today. The patient should monitor fetal kick counts daily, starting at 28 weeks' gestation. She should be scheduled for increased fetal surveillance with twice weekly fetal testing after 32 weeks' gestation. In the absence of any complications, delivery is recommended at 39 weeks' gestation. Given the increased risk for hypertensive disorders of pregnancy,  the potential utility of a low dose aspirin in reducing her risk for hypertensive disorders of pregnancy was reviewed. The risks and benefits of low dose aspirin were discussed. She was counseled that she could take 81 mg of aspirin, daily. A prescription was previously provided. Additional maternal evaluation was recommended with a nutrition panel, thyroid function studies, and a hemoglobin A1c.    --Baseline testing was completed on 1/17/2023, the patient's results included: H/H12.9/38, MCV 96, platelet count 704,875, potassium 4.3, creatinine 0.5, calcium 9.6, ALT 8, AST 17, magnesium 1.7, ferritin 30, folate >20, vitamin B12 172, vitamin D 32, hemoglobin A1c 4.5%, TSH 1.18, free T40.96, free T32.8, TPO negative, antithyroglobulin antibody negative, , uric acid 5.2, urine culture no urinalysis negative, urine protein creatinine ratio 0.1.  --Additional recommendations are below. 3.   Migraine headaches -- The patient reported a history of migraine headaches. She reports that she was diagnosed 2 years ago. She follows with her PCP. She was using Imitrex as needed prior to pregnancy. She denies having any issues. Counseling was previously provided. Counseling was reviewed. The patient did not have any additional questions or concerns. The patient was counseled that migraine headaches can improve during pregnancy. However, in some patients symptoms are exacerbated. She was counseled regarding the use of magnesium oxide 400 mg twice daily and riboflavin 100 mg daily in reducing the frequency and intensity of migraine headaches. Precautions were reviewed, and she was counseled that if she develops the worst headache of her life or a headache with neurological deficits, to present immediately for evaluation. She expressed verbal understanding of this counseling. Because of the patient's headache symptoms, a baseline nutrition panel and thyroid function testing was recommended. -- Baseline testing completed 1/17/2023. Results summarized above. Additional recommendations are below. 4.  Low vitamin D -- The patient's vitamin D was low at 32  --Recommend vitamin D3 2000 IU daily  --Monitor levels serially  --Monitor nutrition panel q4-6 weeks (CBC, CMP, magnesium, ferritin, folate, vitamin B12, vitamin D25OH), on/after 2/28/2023  --Follow up with PCP for long term monitoring and management    5.   Vitamin B12 deficiency -- The patient's vitamin B12 was deficient at 172  --Recommend vitamin B12 1000 mcg PO daily  --Monitor levels serially  --Monitor nutrition panel q4-6 weeks (CBC, CMP, magnesium, ferritin, folate, vitamin B12, vitamin D25OH), repeat testing on/after 2/28/2023  --Follow up with PCP for long term monitoring and management      6. Elevated umbilical artery PI -- The umbilical artery PI was elevated. End-diastolic flow was seen. Overall, fetal growth was appropriate for gestational age. The patient was counseled that this finding can be indicative of placental dysfunction. It also may be associated with an increased risk for poor fetal growth, hypertensive disorders of pregnancy, and stillbirth. Thus, increased fetal surveillance is recommended. The patient was counseled to monitor fetal kick counts daily, instructions were reviewed. The patient should have a follow-up ultrasound weekly for biophysical profiles and repeat Doppler studies. Fetal growth should be reassessed in 2-3 weeks. She should start twice-weekly testing at 32 weeks gestation, weekly ultrasound and weekly nonstress test.      --I requested the patient return for a follow-up assessment in 1 week unless there is a clinical reason for her to return prior to that time. She is to call if she has any problems or questions prior to her next visit. Further evaluation and management will be dependent on her clinical presentation and the results of her testing. --The patient was advised to call if she has any increased vaginal discharge, vaginal bleeding, contractions, abdominal pain, back pain or any new significant symptomatology prior to her next visit. I advised her that these are signs and symptoms of cervical change and require follow-up assessment when they occur. Preeclampsia precautions were also reviewed with the patient. --The patient was also counseled to call and/or return with any concerns for decreased fetal activity.     --The patient is to continue to follow with you in your office for ongoing obstetric care. --The total time spent on today's visit was 30 minutes. This included preparation for the visit (i.e. reviewing prior external notes and test results), performance of a medically appropriate history and examination, counseling, orders for medications, tests or other procedures, and coordination of care. Greater than 50% of the time was spent face-to-face with the patient. This time is exclusive of procedures performed. I answered all of  the patient's questions to her satisfaction. I asked her to call if she had any additional questions prior to her next visit. --At the conclusion of the visit, the patient appeared to have a good understanding of the issues discussed. I answered all of her questions to her satisfaction. I asked her to call if she had any additional questions prior to her next visit. --Thank you for allowing me to participate in the care of this pleasant patient. Please don't hesitate to call me if you have any questions. Sincerely,      Fernando Flores MD, [facility] [place] [address]  [phone]      *All or parts of this note may have been generated using a voice recognition program. There may be typo, grammar, or Word substitution errors that have escaped my review of this note. independent

## 2023-07-28 NOTE — PROGRESS NOTE ADULT - PROBLEM SELECTOR PLAN 9
Patient with history of back pain. Takes gabapentin 350 mg TID mostly for migraines but has been taking it for pain.     Plan:  - C/w gabapentin 300 mg TID.

## 2023-07-28 NOTE — CONSULT NOTE ADULT - SUBJECTIVE AND OBJECTIVE BOX
Patient is a 83y old  Female who presents with a chief complaint of Syncope (27 Jul 2023 21:57)      HPI:  Ms. Turpin is a 82 yo female with pmhx of HTN, HLD, DM II, migraines, anxiety, arthritis, glaucoma presenting from [facility] after a syncopal episode at home. Patient and her sister at bedside state that they live together and that the patient's sister had covid last weekend. Consequently the patient was found to be covid positive at home on Tuesday after a positive at home test. Her outpatient provider was contacted and prescribed her paxlovid to be taken one time in the morning and one time at night and to stop taking her clonipin, valsartan, and atorvastatin. She had a temperature on Wednesday night to 102 and took tylenol with reduction in the fever. States that on Thursday morning after eating and taking her morning paxlovid, she went back to bed secondary to lethargy but began feeling nauseous, calling her sister over. Once her sister attempted to help her get up to get to the bathroom, the patient had uncontrolled bowel movements and secondarily passed out for briefly right back into her bed. Her sister aggressively attempted to wake her up as to which the patient states that she was aware of happening and 911 was called. Currently only complains of mild abdominal pain.     In the ED:  Initial vital signs: T: 99.4 F, HR: 51, BP: 129/60, R: 18, SpO2: 93% on RA  Labs: significant for mildly elevated pro-bnp at 380  Imaging: CT angio w/ no PE, pulmonary parenchyma demonstrates bilateral lung mosaic   attenuation.  EKG: Sinus juve w/ first degree AV block  Medications: Gabapentin 300 mg x1  Consults: none  (27 Jul 2023 21:57)    PAST MEDICAL & SURGICAL HISTORY:  HTN (hypertension)      HLD (hyperlipidemia)      Type 2 diabetes mellitus      Anxiety      Glaucoma      Arthritis      Back pain      Status post phlebectomy  stab phlebectomy to RLE on 1/18/18        MEDICATIONS  (STANDING):  amLODIPine   Tablet 5 milliGRAM(s) Oral daily  atorvastatin 40 milliGRAM(s) Oral at bedtime  enoxaparin Injectable 40 milliGRAM(s) SubCutaneous every 24 hours  escitalopram 10 milliGRAM(s) Oral daily  gabapentin 300 milliGRAM(s) Oral three times a day  melatonin 3 milliGRAM(s) Oral at bedtime  remdesivir  IVPB   IV Intermittent   valsartan 80 milliGRAM(s) Oral daily    MEDICATIONS  (PRN):  acetaminophen     Tablet .. 650 milliGRAM(s) Oral every 6 hours PRN Temp greater or equal to 38C (100.4F), Mild Pain (1 - 3)           FAMILY HISTORY:      CBC Full  -  ( 28 Jul 2023 07:36 )  WBC Count : 3.20 K/uL  RBC Count : 4.29 M/uL  Hemoglobin : 12.9 g/dL  Hematocrit : 40.0 %  Platelet Count - Automated : 134 K/uL  Mean Cell Volume : 93.2 fl  Mean Cell Hemoglobin : 30.1 pg  Mean Cell Hemoglobin Concentration : 32.3 gm/dL  Auto Neutrophil # : x  Auto Lymphocyte # : x  Auto Monocyte # : x  Auto Eosinophil # : x  Auto Basophil # : x  Auto Neutrophil % : x  Auto Lymphocyte % : x  Auto Monocyte % : x  Auto Eosinophil % : x  Auto Basophil % : x      07-28    142  |  107  |  12  ----------------------------<  105<H>  3.7   |  23  |  0.83    Ca    7.9<L>      28 Jul 2023 07:36  Phos  2.8     07-28  Mg     1.9     07-28    TPro  6.2  /  Alb  3.6  /  TBili  0.2  /  DBili  x   /  AST  24  /  ALT  21  /  AlkPhos  79  07-28      Urinalysis Basic - ( 28 Jul 2023 07:36 )    Color: x / Appearance: x / SG: x / pH: x  Gluc: 105 mg/dL / Ketone: x  / Bili: x / Urobili: x   Blood: x / Protein: x / Nitrite: x   Leuk Esterase: x / RBC: x / WBC x   Sq Epi: x / Non Sq Epi: x / Bacteria: x        Radiology :     < from: CT Angio Chest PE Protocol w/ IV Cont (07.27.23 @ 10:57) >  ACC: [id number] EXAM:  CT ANGIO CHEST PULM ART WAWIC   ORDERED BY: RACHELLE ORTIZ     PROCEDURE DATE:  07/27/2023          INTERPRETATION:  .    CONTRAST/COMPLICATIONS:  IV Contrast: Omnipaque 350  70 cc administered   30 cc discarded  Oral Contrast: NONE  Complications: None reported at time of study completion    CTA (CT angiography) of the CHEST dated 7/27/2023 10:57 AM    INDICATION: r/o PE    TECHNIQUE: CT angiography of the chest was performed during bolus   injection of intravenous contrast.Post-processing including the   production of axial and coronal and sagittal multiplanar reformatted   images and coronal maximum intensity projections (MIPs) was performed.    PRIOR STUDY: None.    FINDINGS: No visualized PE. Some limitation to evaluation of bilateral   lower lobes segmental and subsegmental pulmonary arteries due to poor   patient cooperation with breath-hold. The pulmonary trunk is of normal   caliber measuring 2.6 cm.     The heart is borderline enlarged.     No pericardial effusion is seen.     The great vessels are unremarkable.    No mediastinal or hilar lymphadenopathy is seen.    Evaluation of the pulmonary parenchyma demonstrates bilateral lung mosaic   attenuation.       No pleural effusions are seen.    Limited evaluation of the upper abdomen demonstrates partially visualized   8.4 cm probable exophytic cortical cyst arising off the upper pole right   kidney. Small hiatal hernia.    Evaluation of the osseous structures demonstrates degenerative changes.      IMPRESSION:  No visualized PE.         Review of Systems : per HPI               Vital Signs Last 24 Hrs  T(C): 36.9 (28 Jul 2023 05:33), Max: 37.4 (27 Jul 2023 09:47)  T(F): 98.5 (28 Jul 2023 05:33), Max: 99.4 (27 Jul 2023 09:47)  HR: 50 (28 Jul 2023 05:33) (50 - 93)  BP: 173/82 (28 Jul 2023 05:33) (115/82 - 186/84)  BP(mean): --  RR: 18 (28 Jul 2023 05:33) (18 - 20)  SpO2: 95% (28 Jul 2023 05:33) (92% - 96%)    Parameters below as of 28 Jul 2023 05:33  Patient On (Oxygen Delivery Method): room air            Physical Exam :  on [employer]TA[employer] COVID isolation ,  83 y o woman lying comfortably in semi Alonzo's position , awake , alert , no acute complaints , feels tired     Head : normocephalic , atraumatic    Eyes : PERRLA , EOMI , no nystagmus , sclera anicteric    ENT / FACE : neg nasal discharge , uvula midline , no oropharyngeal erythema / exudate    Neck : supple , negative JVD , negative carotid bruits , no thyromegaly    Chest : CTA bilaterally , neg wheeze / rhonchi / rales / crackles / egophany    Cardiovascular : regular rate and rhythm , neg murmurs / rubs / gallops    Abdomen : soft , non distended , non tender to palpation in all 4 quadrants ,  normal bowel sounds     Extremities : WWP , neg cyanosis /clubbing / edema     Neurologic Exam :    Alert and oriented to person , place , date/year , speech fluent w/o dysarthria     Cranial Nerves:     II :                         pupils equal , round and reactive to light , visual fields intact   III/ IV/VI :              extraocular movements intact , neg nystagmus , neg ptosis  V :                        facial sensation intact , V1-3 normal  VII :                      face symmetric , no droop , normal eye closure and smile  VIII :                     hearing intact to finger rub bilaterally  IX and X :             no hoarseness , gag intact , palate/ uvula rise symmetrically  XI :                       SCM / trapezius strength intact bilateral  XII :                      no tongue deviation    Motor Exam:          Right UE:               no focal weakness ,  > 3+/5 throughout  , no drift     Left UE:                 no focal weakness ,  > 3+/5 throughout  , no drift         Right LE:    no focal weakness ,  > 3+/5 throughout        Left LE:    no focal weakness ,  > 3+/5 throughout         Sensation :         intact to light touch x 4 extremities                            no neglect or extinction on double simultaneous testing      DTR :     biceps/brachioradialis : equal                      patella/ankle : equal     neg Babinski       Coordination :      Finger to Nose :  neg dysmetria bilaterally       Gait :  not tested          PM&R Impression :     admitted for syncope at home , COVID 19 +    - no acute pathology on CT angio chest     - no focal neurologic deficits         Recommendations / Plan :           1) Physical / Occupational therapy focusing on therapeutic exercises , equipment evaluation , bed mobility/transfer out of bed evaluation , progressive ambulation with assistive devices prn .    2) Current disposition plan recommendation  :    pending functional progress

## 2023-07-28 NOTE — DISCHARGE NOTE NURSING/CASE MANAGEMENT/SOCIAL WORK - NSDCPEFALRISK_GEN_ALL_CORE
For information on Fall & Injury Prevention, visit: [email or web address] OR  [email or web address] OR  https://www.cdc.gov/steadi/patient.html

## 2023-07-28 NOTE — PROGRESS NOTE ADULT - PROBLEM SELECTOR PLAN 2
-Tested positive for COVID outpatient and was on Paxlovid x2d with ongoing sx and generalized fatigue/lightheadedness. COVID PCR positive on current admission however not meeting SIRS/sepsis. Afebrile without norming wbc and satting well on room air. Not candidate for steroids.   -This morning patient asymptomatic. No longer complaining of myalgias, fevers, nausea, dizziness, fatigue.    Plan  -Continue Remdesivir in high-risk patient given age and co-mobidities.   -Monitor LFTs while on Remdesivir. -EKG showing sinus bradycardia with 1st degree AV block. According to PCP Dr. Subramanian, patient has no history of AV block, last EKG in 5/2022 showing CA interval of 188.   -HR in 50s during admission  -Syncope may represent symptomatic bradyarrhythmia    Plan  -Repeat EKG pending. May consider EP consult if markedly prolonged CA interval  -Patient scheduled to see Dr. Subramanian on 7/31 at 12:45 pm for follow up

## 2023-07-28 NOTE — DISCHARGE NOTE PROVIDER - NSDCCPCAREPLAN_GEN_ALL_CORE_FT
PRINCIPAL DISCHARGE DIAGNOSIS  Diagnosis: 2019 novel coronavirus disease (COVID-19)  Assessment and Plan of Treatment: You were diagnosed with the new COVID-19 coronavirus infection. The treatment for this infection is supportive care, which includes: rest, maintaining adequate oral intake of food and water, and taking acetaminophen/tylenol for fever. You were given Remdesivir in the hospital. You most likely had an episode to fainting due to low fluid volume due to your diarrhea and due to COVID. Please visit your nearest urgent care or emergency department should you start to experience: severe shortness of breath, severe cough/wheezing/difficulty breathing, or fever >103 for 3 days. Please maintain a good healthy diet. If you have any questions, please call your primary care provider.

## 2023-07-28 NOTE — PROGRESS NOTE ADULT - PROBLEM SELECTOR PLAN 3
Patient with newly diagnosed diabetes. Began taking Jardiance, unclear about dosing at home.    Plan:  - Moderate corrective sliding scale. -Tested positive for COVID outpatient and was on Paxlovid x2d with ongoing sx and generalized fatigue/lightheadedness. COVID PCR positive on current admission however not meeting SIRS/sepsis. Afebrile without norming wbc and satting well on room air. Not candidate for steroids.   -This morning patient asymptomatic. No longer complaining of myalgias, fevers, nausea, dizziness, fatigue.    Plan  -Continue Remdesivir in high-risk patient given age and co-mobidities.   -Monitor LFTs while on Remdesivir.

## 2023-07-28 NOTE — PROGRESS NOTE ADULT - PROBLEM SELECTOR PLAN 4
Patient with history of anxiety, takes lexapro 10 mg qd.     Plan:  - C/w above medication. Patient with newly diagnosed diabetes. Began taking Jardiance, unclear about dosing at home.    Plan:  - Moderate corrective sliding scale.

## 2023-07-28 NOTE — PROGRESS NOTE ADULT - PROBLEM SELECTOR PLAN 5
-Patient with history of elevated blood pressure. Home medications include Amlodipine 5 mg qd, valsartan 80 mg qd. Stopped taking valsartan and klonipin .5 mg qd while taking paxlovid and decreased amlodipine to 2.5 mg as well.   -Blood pressure 165/75    Plan:  - C/w amlodipine 5 mg and valsartan 80 mg qd. Patient with history of anxiety, takes lexapro 10 mg qd.     Plan:  - C/w above medication.

## 2023-07-28 NOTE — PROGRESS NOTE ADULT - SUBJECTIVE AND OBJECTIVE BOX
**INCOMPLETE NOTE    OVERNIGHT EVENTS:    SUBJECTIVE:  Patient seen and examined at bedside.  ROS: Patient denies h/n/v/d, fever, chills, cp, palpitations, sob, abd pain, leg swelling, rashes, dysuria, and changes in BM.     Vital Signs Last 12 Hrs  T(F): 98.7 (07-28-23 @ 09:10), Max: 98.7 (07-28-23 @ 09:10)  HR: 58 (07-28-23 @ 09:10) (50 - 58)  BP: 165/75 (07-28-23 @ 09:10) (165/75 - 173/82)  BP(mean): --  RR: 18 (07-28-23 @ 09:10) (18 - 18)  SpO2: 95% (07-28-23 @ 09:10) (95% - 95%)  I&O's Summary      PHYSICAL EXAM:  Constitutional: NAD, comfortable in bed.  HEENT: NC/AT, PERRLA, EOMI, no conjunctival pallor or scleral icterus, MMM  Neck: Supple, no JVD  Respiratory: CTA B/L. No w/r/r.   Cardiovascular: RRR, normal S1 and S2, no m/r/g.   Gastrointestinal: +BS, soft NTND, no guarding or rebound tenderness, no palpable masses   Extremities: wwp; no cyanosis, clubbing or edema.   Vascular: Pulses equal and strong throughout.   Neurological: AAOx3, no CN deficits, strength and sensation intact throughout.   Skin: No gross skin abnormalities or rashes        LABS:                        12.9   3.20  )-----------( 134      ( 28 Jul 2023 07:36 )             40.0     07-28    142  |  107  |  12  ----------------------------<  105<H>  3.7   |  23  |  0.83    Ca    7.9<L>      28 Jul 2023 07:36  Phos  2.8     07-28  Mg     1.9     07-28    TPro  6.2  /  Alb  3.6  /  TBili  0.2  /  DBili  x   /  AST  24  /  ALT  21  /  AlkPhos  79  07-28    PT/INR - ( 27 Jul 2023 09:51 )   PT: 12.6 sec;   INR: 1.15          PTT - ( 27 Jul 2023 09:51 )  PTT:34.9 sec  Urinalysis Basic - ( 28 Jul 2023 07:36 )    Color: x / Appearance: x / SG: x / pH: x  Gluc: 105 mg/dL / Ketone: x  / Bili: x / Urobili: x   Blood: x / Protein: x / Nitrite: x   Leuk Esterase: x / RBC: x / WBC x   Sq Epi: x / Non Sq Epi: x / Bacteria: x          RADIOLOGY & ADDITIONAL TESTS:    MEDICATIONS  (STANDING):  amLODIPine   Tablet 5 milliGRAM(s) Oral daily  atorvastatin 40 milliGRAM(s) Oral at bedtime  dextrose 5%. 1000 milliLiter(s) (100 mL/Hr) IV Continuous <Continuous>  dextrose 5%. 1000 milliLiter(s) (50 mL/Hr) IV Continuous <Continuous>  dextrose 50% Injectable 25 Gram(s) IV Push once  dextrose 50% Injectable 25 Gram(s) IV Push once  dextrose 50% Injectable 12.5 Gram(s) IV Push once  enoxaparin Injectable 40 milliGRAM(s) SubCutaneous every 24 hours  escitalopram 10 milliGRAM(s) Oral daily  gabapentin 300 milliGRAM(s) Oral three times a day  glucagon  Injectable 1 milliGRAM(s) IntraMuscular once  insulin lispro (ADMELOG) corrective regimen sliding scale   SubCutaneous three times a day before meals  insulin lispro (ADMELOG) corrective regimen sliding scale   SubCutaneous at bedtime  melatonin 3 milliGRAM(s) Oral at bedtime  remdesivir  IVPB   IV Intermittent   valsartan 80 milliGRAM(s) Oral daily    MEDICATIONS  (PRN):  acetaminophen     Tablet .. 650 milliGRAM(s) Oral every 6 hours PRN Temp greater or equal to 38C (100.4F), Mild Pain (1 - 3)  dextrose Oral Gel 15 Gram(s) Oral once PRN Blood Glucose LESS THAN 70 milliGRAM(s)/deciliter   **INCOMPLETE NOTE    OVERNIGHT EVENTS:  No events overnight.    SUBJECTIVE:    Patient seen and examined at bedside this morning, She states she is doing well and offers no complaints. Denies nausea, vomiting, fevers, chills, chest pain, shortness of breath, dizziness. Denies further syncopal episodes. States she feels much better overall and would like to go home.    Vital Signs Last 12 Hrs  T(F): 98.7 (07-28-23 @ 09:10), Max: 98.7 (07-28-23 @ 09:10)  HR: 58 (07-28-23 @ 09:10) (50 - 58)  BP: 165/75 (07-28-23 @ 09:10) (165/75 - 173/82)  BP(mean): --  RR: 18 (07-28-23 @ 09:10) (18 - 18)  SpO2: 95% (07-28-23 @ 09:10) (95% - 95%)  I&O's Summary      PHYSICAL EXAM:  Constitutional: NAD, comfortable in bed.  HEENT: NC/AT, PERRLA, EOMI, no conjunctival pallor or scleral icterus, MMM  Respiratory: CTA B/L. No w/r/r.   Cardiovascular: RRR, normal S1 and S2, no m/r/g.   Gastrointestinal: +BS, soft NTND, no guarding or rebound tenderness, no palpable masses   Extremities: wwp; no cyanosis, clubbing. 1+ pitting edema lower extremities bilaterally.  Vascular: Pulses equal and strong throughout.   Neurological: AAOx3, no CN deficits, strength and sensation intact throughout.   Skin: No gross skin abnormalities or rashes        LABS:                        12.9   3.20  )-----------( 134      ( 28 Jul 2023 07:36 )             40.0     07-28    142  |  107  |  12  ----------------------------<  105<H>  3.7   |  23  |  0.83    Ca    7.9<L>      28 Jul 2023 07:36  Phos  2.8     07-28  Mg     1.9     07-28    TPro  6.2  /  Alb  3.6  /  TBili  0.2  /  DBili  x   /  AST  24  /  ALT  21  /  AlkPhos  79  07-28    PT/INR - ( 27 Jul 2023 09:51 )   PT: 12.6 sec;   INR: 1.15          PTT - ( 27 Jul 2023 09:51 )  PTT:34.9 sec  Urinalysis Basic - ( 28 Jul 2023 07:36 )    Color: x / Appearance: x / SG: x / pH: x  Gluc: 105 mg/dL / Ketone: x  / Bili: x / Urobili: x   Blood: x / Protein: x / Nitrite: x   Leuk Esterase: x / RBC: x / WBC x   Sq Epi: x / Non Sq Epi: x / Bacteria: x          RADIOLOGY & ADDITIONAL TESTS:    MEDICATIONS  (STANDING):  amLODIPine   Tablet 5 milliGRAM(s) Oral daily  atorvastatin 40 milliGRAM(s) Oral at bedtime  dextrose 5%. 1000 milliLiter(s) (100 mL/Hr) IV Continuous <Continuous>  dextrose 5%. 1000 milliLiter(s) (50 mL/Hr) IV Continuous <Continuous>  dextrose 50% Injectable 25 Gram(s) IV Push once  dextrose 50% Injectable 25 Gram(s) IV Push once  dextrose 50% Injectable 12.5 Gram(s) IV Push once  enoxaparin Injectable 40 milliGRAM(s) SubCutaneous every 24 hours  escitalopram 10 milliGRAM(s) Oral daily  gabapentin 300 milliGRAM(s) Oral three times a day  glucagon  Injectable 1 milliGRAM(s) IntraMuscular once  insulin lispro (ADMELOG) corrective regimen sliding scale   SubCutaneous three times a day before meals  insulin lispro (ADMELOG) corrective regimen sliding scale   SubCutaneous at bedtime  melatonin 3 milliGRAM(s) Oral at bedtime  remdesivir  IVPB   IV Intermittent   valsartan 80 milliGRAM(s) Oral daily    MEDICATIONS  (PRN):  acetaminophen     Tablet .. 650 milliGRAM(s) Oral every 6 hours PRN Temp greater or equal to 38C (100.4F), Mild Pain (1 - 3)  dextrose Oral Gel 15 Gram(s) Oral once PRN Blood Glucose LESS THAN 70 milliGRAM(s)/deciliter

## 2023-07-28 NOTE — DISCHARGE NOTE PROVIDER - HOSPITAL COURSE
#Discharge: do not delete    Patient is 82 yo F with past medical history of HTN, HLD, DM II, migraines, anxiety, arthritis, glaucoma, Covid positive presented with syncopal episode at home, admitted for syncopal workup.    Hospital course (by problem):   Syncope.   Patient cites an episode of syncope while laying in bed with dizziness and nausea. Orthostatic vitals positive in ED.  Patient improved with fluids. EKG showed sinus bradycardia with 1st degree AV block. Follow up with  Dr. Subramanian on 7/31 at 12:45 pm.    2019 novel coronavirus disease (COVID-19).  Tested positive for COVID outpatient and was on Paxlovid x2d with ongoing sx and generalized fatigue/lightheadedness. COVID PCR positive on current admission however not meeting SIRS/sepsis. Afebrile without normal wbc and saturating well on room air. Not candidate for steroids.   Patient started on Remdesivir in high-risk patient given age and co-mobidities.    Type 2 diabetes mellitus.  Continue home medications - Jardiance.    Anxiety.  Continue with lexapro 10 mg qd.     HTN (hypertension).  Patient with history of elevated blood pressure. Home medications include Amlodipine 5 mg qd, valsartan 80 mg qd. Stopped taking valsartan and klonipin .5 mg qd while taking paxlovid and decreased amlodipine to 2.5 mg. Continue home medications/    HLD (hyperlipidemia).  Continue atorvastatin 40 mg qhs.     Patient was discharged to: home    New medications: Bactrim    Items to follow up as outpatient: EKG results    Physical exam at the time of discharge:    Constitutional: NAD, comfortable in bed.  HEENT: NC/AT, PERRLA, EOMI, no conjunctival pallor or scleral icterus, MMM  Respiratory: CTA B/L. No w/r/r.   Cardiovascular: RRR, normal S1 and S2, no m/r/g, bradycardia  Gastrointestinal: +BS, soft NTND, no guarding or rebound tenderness, no palpable masses   Extremities: wwp; no cyanosis, clubbing. 1+ pitting edema lower extremities bilaterally.  Vascular: Pulses equal and strong throughout.   Neurological: AAOx3, no CN deficits, strength and sensation intact throughout.   Skin: No gross skin abnormalities or rashes

## 2023-07-28 NOTE — PROGRESS NOTE ADULT - PROBLEM SELECTOR PLAN 1
-Patient cites an episode of syncope while laying in bed. She notes immediately prior to episode, she had dizziness and nausea, and began to get up to reach for a bucket as she thought she would vomit. She subsequently describes presyncope and woke up on the floor. Her partner witnessed the episode, and said there was no head trauma, and the patient regained consciousness after about one minute. Of note patient has no history of syncopal episodes.   -She tested positive for COVID on wednesday -Patient cites an episode of syncope while laying in bed. She notes immediately prior to episode, she had dizziness and nausea, and began to get up to reach for a bucket as she thought she would vomit. She subsequently describes presyncope and woke up on the floor. Her partner witnessed the episode, and said there was no head trauma, and the patient regained consciousness after about one minute. Of note patient has no history of syncopal episodes.   -Orthostatics positive in ED  -EKG showing sinus bradycardia with 1st degree AV block. According to PCP Dr. Subramanian, patient has no history of AV block, last EKG in 5/2022 showing LA interval of 188.   -Syncope most likely secondary to orthostatic hypotension vs less likely symptomatic bradyarrhythmia.     Plan  -Repeat EKG pending. May consider EP consult if markedly prolonged LA interval  -Will give fluid bolus. Per PCP, patient with no history of heart failure. No echos on records  -Continue to monitor vitals  -PT eval pending  -Patient scheduled to see Dr. Subramanian on 7/31 at 12:45 pm for follow up

## 2023-07-28 NOTE — DISCHARGE NOTE PROVIDER - CARE PROVIDERS DIRECT ADDRESSES
[email or web address]

## 2023-08-04 DIAGNOSIS — Z88.5 ALLERGY STATUS TO NARCOTIC AGENT: ICD-10-CM

## 2023-08-04 DIAGNOSIS — F41.9 ANXIETY DISORDER, UNSPECIFIED: ICD-10-CM

## 2023-08-04 DIAGNOSIS — I44.0 ATRIOVENTRICULAR BLOCK, FIRST DEGREE: ICD-10-CM

## 2023-08-04 DIAGNOSIS — E86.0 DEHYDRATION: ICD-10-CM

## 2023-08-04 DIAGNOSIS — R55 SYNCOPE AND COLLAPSE: ICD-10-CM

## 2023-08-04 DIAGNOSIS — Z79.84 LONG TERM (CURRENT) USE OF ORAL HYPOGLYCEMIC DRUGS: ICD-10-CM

## 2023-08-04 DIAGNOSIS — E78.5 HYPERLIPIDEMIA, UNSPECIFIED: ICD-10-CM

## 2023-08-04 DIAGNOSIS — U07.1 COVID-19: ICD-10-CM

## 2023-08-04 DIAGNOSIS — R19.7 DIARRHEA, UNSPECIFIED: ICD-10-CM

## 2023-08-04 DIAGNOSIS — E11.9 TYPE 2 DIABETES MELLITUS WITHOUT COMPLICATIONS: ICD-10-CM

## 2023-08-04 DIAGNOSIS — Z91.018 ALLERGY TO OTHER FOODS: ICD-10-CM

## 2023-08-04 DIAGNOSIS — J12.82 PNEUMONIA DUE TO CORONAVIRUS DISEASE 2019: ICD-10-CM

## 2023-08-04 DIAGNOSIS — H40.9 UNSPECIFIED GLAUCOMA: ICD-10-CM

## 2023-08-04 DIAGNOSIS — M54.9 DORSALGIA, UNSPECIFIED: ICD-10-CM

## 2023-08-04 DIAGNOSIS — I95.1 ORTHOSTATIC HYPOTENSION: ICD-10-CM

## 2023-08-04 DIAGNOSIS — Z79.899 OTHER LONG TERM (CURRENT) DRUG THERAPY: ICD-10-CM

## 2023-08-04 DIAGNOSIS — Z88.1 ALLERGY STATUS TO OTHER ANTIBIOTIC AGENTS STATUS: ICD-10-CM

## 2023-08-04 DIAGNOSIS — I10 ESSENTIAL (PRIMARY) HYPERTENSION: ICD-10-CM

## 2023-08-04 DIAGNOSIS — G43.909 MIGRAINE, UNSPECIFIED, NOT INTRACTABLE, WITHOUT STATUS MIGRAINOSUS: ICD-10-CM

## 2023-09-19 ENCOUNTER — OUTPATIENT (OUTPATIENT)
Dept: OUTPATIENT SERVICES | Facility: HOSPITAL | Age: 83
LOS: 1 days | End: 2023-09-19
Payer: MEDICARE

## 2023-09-19 ENCOUNTER — APPOINTMENT (OUTPATIENT)
Dept: ULTRASOUND IMAGING | Facility: CLINIC | Age: 83
End: 2023-09-19

## 2023-09-19 DIAGNOSIS — Z98.890 OTHER SPECIFIED POSTPROCEDURAL STATES: Chronic | ICD-10-CM

## 2023-09-19 PROBLEM — H40.9 UNSPECIFIED GLAUCOMA: Chronic | Status: ACTIVE | Noted: 2023-07-28

## 2023-09-19 PROBLEM — M54.9 DORSALGIA, UNSPECIFIED: Chronic | Status: ACTIVE | Noted: 2023-07-28

## 2023-09-19 PROBLEM — M19.90 UNSPECIFIED OSTEOARTHRITIS, UNSPECIFIED SITE: Chronic | Status: ACTIVE | Noted: 2023-07-28

## 2023-09-19 PROBLEM — E11.9 TYPE 2 DIABETES MELLITUS WITHOUT COMPLICATIONS: Chronic | Status: ACTIVE | Noted: 2023-07-28

## 2023-09-19 PROBLEM — F41.9 ANXIETY DISORDER, UNSPECIFIED: Chronic | Status: ACTIVE | Noted: 2023-07-28

## 2023-09-19 PROCEDURE — 76770 US EXAM ABDO BACK WALL COMP: CPT | Mod: 26

## 2023-10-24 ENCOUNTER — APPOINTMENT (OUTPATIENT)
Dept: PAIN MANAGEMENT | Facility: CLINIC | Age: 83
End: 2023-10-24
Payer: MEDICARE

## 2023-10-24 VITALS — DIASTOLIC BLOOD PRESSURE: 84 MMHG | HEART RATE: 63 BPM | SYSTOLIC BLOOD PRESSURE: 135 MMHG | OXYGEN SATURATION: 95 %

## 2023-10-24 DIAGNOSIS — M47.816 SPONDYLOSIS W/OUT MYELOPATHY OR RADICULOPATHY, LUMBAR REGION: ICD-10-CM

## 2023-10-24 DIAGNOSIS — M43.10 SPONDYLOLISTHESIS, SITE UNSPECIFIED: ICD-10-CM

## 2023-10-24 DIAGNOSIS — M48.061 SPINAL STENOSIS, LUMBAR REGION WITHOUT NEUROGENIC CLAUDICATION: ICD-10-CM

## 2023-10-24 DIAGNOSIS — M24.28 DISORDER OF LIGAMENT, VERTEBRAE: ICD-10-CM

## 2023-10-24 DIAGNOSIS — M54.16 RADICULOPATHY, LUMBAR REGION: ICD-10-CM

## 2023-10-24 PROCEDURE — 64494 INJ PARAVERT F JNT L/S 2 LEV: CPT

## 2023-10-24 PROCEDURE — 99214 OFFICE O/P EST MOD 30 MIN: CPT | Mod: 25

## 2023-10-24 PROCEDURE — 64493 INJ PARAVERT F JNT L/S 1 LEV: CPT

## 2023-11-13 ENCOUNTER — APPOINTMENT (OUTPATIENT)
Dept: PAIN MANAGEMENT | Facility: AMBULATORY SURGERY CENTER | Age: 83
End: 2023-11-13

## 2023-11-15 ENCOUNTER — APPOINTMENT (OUTPATIENT)
Dept: PAIN MANAGEMENT | Facility: CLINIC | Age: 83
End: 2023-11-15

## 2024-01-11 ENCOUNTER — APPOINTMENT (OUTPATIENT)
Dept: ORTHOPEDIC SURGERY | Facility: CLINIC | Age: 84
End: 2024-01-11

## 2024-02-23 ENCOUNTER — OUTPATIENT (OUTPATIENT)
Dept: OUTPATIENT SERVICES | Facility: HOSPITAL | Age: 84
LOS: 1 days | End: 2024-02-23

## 2024-02-23 ENCOUNTER — APPOINTMENT (OUTPATIENT)
Dept: ULTRASOUND IMAGING | Facility: CLINIC | Age: 84
End: 2024-02-23
Payer: MEDICARE

## 2024-02-23 DIAGNOSIS — Z98.890 OTHER SPECIFIED POSTPROCEDURAL STATES: Chronic | ICD-10-CM

## 2024-02-23 PROCEDURE — 93971 EXTREMITY STUDY: CPT | Mod: 26,LT

## 2024-04-15 NOTE — ED PROVIDER NOTE - WET READ LAUNCH FT
Pt is legally  but voices that he is not with his wife, no children, no living will or POA. NO PCP. Pt identified multiple needs when assessing for social determinants of health; therefore, referred to [place] with pt consent.    04/15/24 1206   Discharge Assessment   Assessment Type Discharge Planning Assessment   Confirmed/corrected address, phone number and insurance Yes   Confirmed Demographics Correct on Facesheet   Source of Information patient   When was your last doctors appointment?   (NO PCP)   Communicated MT with patient/caregiver Date not available/Unable to determine   People in Home significant other   Do you expect to return to your current living situation? Yes   Do you have help at home or someone to help you manage your care at home? Yes   Who are your caregiver(s) and their phone number(s)? lives with girlfriend. identified 1/2 brother as support [phone]   Prior to hospitilization cognitive status: Unable to Assess   Current cognitive status: Alert/Oriented   Walking or Climbing Stairs Difficulty no   Dressing/Bathing Difficulty no   Home Accessibility stairs to enter home   Number of Stairs, Main Entrance two   Home Layout Able to live on 1st floor   Equipment Currently Used at Home none   Readmission within 30 days? No   Patient currently being followed by outpatient case management? No   Do you currently have service(s) that help you manage your care at home? No   Do you take prescription medications? No   Do you have prescription coverage? Yes   [place] [employer] [place]   Do you have any problems affording any of your prescribed medications? TBD   Is the patient taking medications as prescribed? yes   Who is going to help you get home at discharge? girlfriend   How do you get to doctors appointments? car, drives self;family or friend will provide   Are you on dialysis? No   Do you take coumadin? No   Discharge Plan A Other   DME Needed Upon Discharge  other (see  comments)  (TBD)   Discharge Plan discussed with: Patient   Transition of Care Barriers Other (see comments)  (TBD)   Physical Activity   On average, how many days per week do you engage in moderate to strenuous exercise (like a brisk walk)? 0 days  (no formal exercise but active at work)   On average, how many minutes do you engage in exercise at this level? 0 min   Financial Resource Strain   How hard is it for you to pay for the very basics like food, housing, medical care, and heating? Somewhat  (pt referred to [facility])   Housing Stability   In the last 12 months, was there a time when you were not able to pay the mortgage or rent on time? Y   In the past 12 months, how many times have you moved where you were living? 3   At any time in the past 12 months, were you homeless or living in a shelter (including now)? Y   Transportation Needs   In the past 12 months, has lack of transportation kept you from medical appointments or from getting medications? yes   In the past 12 months, has lack of transportation kept you from meetings, work, or from getting things needed for daily living? Yes   Food Insecurity   Within the past 12 months, you worried that your food would run out before you got the money to buy more. Sometimes   Within the past 12 months, the food you bought just didn't last and you didn't have money to get more. Sometimes   Stress   Do you feel stress - tense, restless, nervous, or anxious, or unable to sleep at night because your mind is troubled all the time - these days? To some exte  (declined mental health resources voicing ability to cope)   Social Connections   In a typical week, how many times do you talk on the phone with family, friends, or neighbors? More than 3   How often do you get together with friends or relatives? More than 3   How often do you attend Confucianist or Alevism services? Never   Do you belong to any clubs or organizations such as Confucianist groups, unions, fraternal or athletic  groups, or school groups? No   How often do you attend meetings of the clubs or organizations you belong to? Never   Are you , , , , never , or living with a partner?   (legally  but states they are not together)   Alcohol Use   Q1: How often do you have a drink containing alcohol? 2-4 pr month   Q2: How many drinks containing alcohol do you have on a typical day when you are drinking? 3 or 4   Q3: How often do you have six or more drinks on one occasion? Never   OTHER   Name(s) of People in Home girlfriend        There are no Wet Read(s) to document.

## 2024-07-27 ENCOUNTER — EMERGENCY (EMERGENCY)
Facility: HOSPITAL | Age: 84
LOS: 1 days | Discharge: HOME | End: 2024-07-27
Attending: EMERGENCY MEDICINE | Admitting: EMERGENCY MEDICINE
Payer: MEDICARE

## 2024-07-27 VITALS
TEMPERATURE: 98 F | HEART RATE: 65 BPM | OXYGEN SATURATION: 96 % | RESPIRATION RATE: 16 BRPM | DIASTOLIC BLOOD PRESSURE: 88 MMHG | SYSTOLIC BLOOD PRESSURE: 166 MMHG

## 2024-07-27 VITALS
SYSTOLIC BLOOD PRESSURE: 180 MMHG | DIASTOLIC BLOOD PRESSURE: 69 MMHG | OXYGEN SATURATION: 94 % | HEART RATE: 70 BPM | TEMPERATURE: 98 F | RESPIRATION RATE: 16 BRPM

## 2024-07-27 DIAGNOSIS — Z91.013 ALLERGY TO SEAFOOD: ICD-10-CM

## 2024-07-27 DIAGNOSIS — R53.1 WEAKNESS: ICD-10-CM

## 2024-07-27 DIAGNOSIS — I10 ESSENTIAL (PRIMARY) HYPERTENSION: ICD-10-CM

## 2024-07-27 DIAGNOSIS — F31.9 BIPOLAR DISORDER, UNSPECIFIED: ICD-10-CM

## 2024-07-27 DIAGNOSIS — Z20.822 CONTACT WITH AND (SUSPECTED) EXPOSURE TO COVID-19: ICD-10-CM

## 2024-07-27 DIAGNOSIS — Z88.1 ALLERGY STATUS TO OTHER ANTIBIOTIC AGENTS: ICD-10-CM

## 2024-07-27 DIAGNOSIS — Z98.890 OTHER SPECIFIED POSTPROCEDURAL STATES: Chronic | ICD-10-CM

## 2024-07-27 DIAGNOSIS — F11.23 OPIOID DEPENDENCE WITH WITHDRAWAL: ICD-10-CM

## 2024-07-27 DIAGNOSIS — E78.5 HYPERLIPIDEMIA, UNSPECIFIED: ICD-10-CM

## 2024-07-27 DIAGNOSIS — E11.9 TYPE 2 DIABETES MELLITUS WITHOUT COMPLICATIONS: ICD-10-CM

## 2024-07-27 DIAGNOSIS — Z88.5 ALLERGY STATUS TO NARCOTIC AGENT: ICD-10-CM

## 2024-07-27 LAB
ALBUMIN SERPL ELPH-MCNC: 3.6 G/DL — SIGNIFICANT CHANGE UP (ref 3.4–5)
ALP SERPL-CCNC: 141 U/L — HIGH (ref 40–120)
ALT FLD-CCNC: 16 U/L — SIGNIFICANT CHANGE UP (ref 12–42)
AMPHET UR-MCNC: NEGATIVE — SIGNIFICANT CHANGE UP
ANION GAP SERPL CALC-SCNC: 9 MMOL/L — SIGNIFICANT CHANGE UP (ref 9–16)
APPEARANCE UR: CLEAR — SIGNIFICANT CHANGE UP
AST SERPL-CCNC: 16 U/L — SIGNIFICANT CHANGE UP (ref 15–37)
BACTERIA # UR AUTO: ABNORMAL /HPF
BARBITURATES UR SCN-MCNC: NEGATIVE — SIGNIFICANT CHANGE UP
BASOPHILS # BLD AUTO: 0.03 K/UL — SIGNIFICANT CHANGE UP (ref 0–0.2)
BASOPHILS NFR BLD AUTO: 0.5 % — SIGNIFICANT CHANGE UP (ref 0–2)
BENZODIAZ UR-MCNC: NEGATIVE — SIGNIFICANT CHANGE UP
BILIRUB SERPL-MCNC: 0.4 MG/DL — SIGNIFICANT CHANGE UP (ref 0.2–1.2)
BILIRUB UR-MCNC: NEGATIVE — SIGNIFICANT CHANGE UP
BUN SERPL-MCNC: 19 MG/DL — SIGNIFICANT CHANGE UP (ref 7–23)
CALCIUM SERPL-MCNC: 9.1 MG/DL — SIGNIFICANT CHANGE UP (ref 8.5–10.5)
CHLORIDE SERPL-SCNC: 108 MMOL/L — SIGNIFICANT CHANGE UP (ref 96–108)
CO2 SERPL-SCNC: 27 MMOL/L — SIGNIFICANT CHANGE UP (ref 22–31)
COCAINE METAB.OTHER UR-MCNC: NEGATIVE — SIGNIFICANT CHANGE UP
COLOR SPEC: YELLOW — SIGNIFICANT CHANGE UP
CREAT SERPL-MCNC: 0.81 MG/DL — SIGNIFICANT CHANGE UP (ref 0.5–1.3)
DIFF PNL FLD: NEGATIVE — SIGNIFICANT CHANGE UP
EGFR: 72 ML/MIN/1.73M2 — SIGNIFICANT CHANGE UP
EOSINOPHIL # BLD AUTO: 0.18 K/UL — SIGNIFICANT CHANGE UP (ref 0–0.5)
EOSINOPHIL NFR BLD AUTO: 3.1 % — SIGNIFICANT CHANGE UP (ref 0–6)
EPI CELLS # UR: PRESENT
FENTANYL UR QL SCN: NEGATIVE — SIGNIFICANT CHANGE UP
FLUAV AG NPH QL: SIGNIFICANT CHANGE UP
FLUBV AG NPH QL: SIGNIFICANT CHANGE UP
GLUCOSE SERPL-MCNC: 128 MG/DL — HIGH (ref 70–99)
GLUCOSE UR QL: NEGATIVE MG/DL — SIGNIFICANT CHANGE UP
HCT VFR BLD CALC: 38.4 % — SIGNIFICANT CHANGE UP (ref 34.5–45)
HGB BLD-MCNC: 12.6 G/DL — SIGNIFICANT CHANGE UP (ref 11.5–15.5)
IMM GRANULOCYTES NFR BLD AUTO: 0.2 % — SIGNIFICANT CHANGE UP (ref 0–0.9)
KETONES UR-MCNC: NEGATIVE MG/DL — SIGNIFICANT CHANGE UP
LEUKOCYTE ESTERASE UR-ACNC: ABNORMAL
LIDOCAIN IGE QN: 23 U/L — SIGNIFICANT CHANGE UP (ref 16–77)
LYMPHOCYTES # BLD AUTO: 1.92 K/UL — SIGNIFICANT CHANGE UP (ref 1–3.3)
LYMPHOCYTES # BLD AUTO: 33 % — SIGNIFICANT CHANGE UP (ref 13–44)
MAGNESIUM SERPL-MCNC: 1.7 MG/DL — SIGNIFICANT CHANGE UP (ref 1.6–2.6)
MCHC RBC-ENTMCNC: 29.6 PG — SIGNIFICANT CHANGE UP (ref 27–34)
MCHC RBC-ENTMCNC: 32.8 GM/DL — SIGNIFICANT CHANGE UP (ref 32–36)
MCV RBC AUTO: 90.4 FL — SIGNIFICANT CHANGE UP (ref 80–100)
METHADONE UR-MCNC: NEGATIVE — SIGNIFICANT CHANGE UP
MONOCYTES # BLD AUTO: 0.58 K/UL — SIGNIFICANT CHANGE UP (ref 0–0.9)
MONOCYTES NFR BLD AUTO: 10 % — SIGNIFICANT CHANGE UP (ref 2–14)
NEUTROPHILS # BLD AUTO: 3.1 K/UL — SIGNIFICANT CHANGE UP (ref 1.8–7.4)
NEUTROPHILS NFR BLD AUTO: 53.2 % — SIGNIFICANT CHANGE UP (ref 43–77)
NITRITE UR-MCNC: NEGATIVE — SIGNIFICANT CHANGE UP
NRBC # BLD: 0 /100 WBCS — SIGNIFICANT CHANGE UP (ref 0–0)
OPIATES UR-MCNC: NEGATIVE — SIGNIFICANT CHANGE UP
PCP SPEC-MCNC: SIGNIFICANT CHANGE UP
PCP UR-MCNC: NEGATIVE — SIGNIFICANT CHANGE UP
PH UR: 8 — SIGNIFICANT CHANGE UP (ref 5–8)
PHOSPHATE SERPL-MCNC: 3.4 MG/DL — SIGNIFICANT CHANGE UP (ref 2.5–4.5)
PLATELET # BLD AUTO: 195 K/UL — SIGNIFICANT CHANGE UP (ref 150–400)
POTASSIUM SERPL-MCNC: 3.8 MMOL/L — SIGNIFICANT CHANGE UP (ref 3.5–5.3)
POTASSIUM SERPL-SCNC: 3.8 MMOL/L — SIGNIFICANT CHANGE UP (ref 3.5–5.3)
PROT SERPL-MCNC: 7.5 G/DL — SIGNIFICANT CHANGE UP (ref 6.4–8.2)
PROT UR-MCNC: NEGATIVE MG/DL — SIGNIFICANT CHANGE UP
RBC # BLD: 4.25 M/UL — SIGNIFICANT CHANGE UP (ref 3.8–5.2)
RBC # FLD: 13 % — SIGNIFICANT CHANGE UP (ref 10.3–14.5)
RBC CASTS # UR COMP ASSIST: 0 /HPF — SIGNIFICANT CHANGE UP (ref 0–4)
RSV RNA NPH QL NAA+NON-PROBE: SIGNIFICANT CHANGE UP
SARS-COV-2 RNA SPEC QL NAA+PROBE: SIGNIFICANT CHANGE UP
SODIUM SERPL-SCNC: 144 MMOL/L — SIGNIFICANT CHANGE UP (ref 132–145)
SP GR SPEC: 1.01 — SIGNIFICANT CHANGE UP (ref 1–1.03)
THC UR QL: NEGATIVE — SIGNIFICANT CHANGE UP
TROPONIN I, HIGH SENSITIVITY RESULT: 41.7 NG/L — SIGNIFICANT CHANGE UP
UROBILINOGEN FLD QL: 1 MG/DL — SIGNIFICANT CHANGE UP (ref 0.2–1)
WBC # BLD: 5.82 K/UL — SIGNIFICANT CHANGE UP (ref 3.8–10.5)
WBC # FLD AUTO: 5.82 K/UL — SIGNIFICANT CHANGE UP (ref 3.8–10.5)
WBC UR QL: 2 /HPF — SIGNIFICANT CHANGE UP (ref 0–5)

## 2024-07-27 PROCEDURE — 99285 EMERGENCY DEPT VISIT HI MDM: CPT | Mod: FS

## 2024-07-27 PROCEDURE — 71046 X-RAY EXAM CHEST 2 VIEWS: CPT | Mod: 26

## 2024-07-27 RX ORDER — CLONAZEPAM 2 MG/1
1 TABLET ORAL
Qty: 6 | Refills: 0
Start: 2024-07-27 | End: 2024-07-29

## 2024-07-27 RX ORDER — ACETAMINOPHEN 325 MG
2 TABLET ORAL
Qty: 56 | Refills: 0
Start: 2024-07-27 | End: 2024-08-02

## 2024-07-27 RX ORDER — NAPROXEN SODIUM 550 MG
500 TABLET ORAL ONCE
Refills: 0 | Status: COMPLETED | OUTPATIENT
Start: 2024-07-27 | End: 2024-07-27

## 2024-07-27 RX ORDER — ACETAMINOPHEN 325 MG
975 TABLET ORAL ONCE
Refills: 0 | Status: COMPLETED | OUTPATIENT
Start: 2024-07-27 | End: 2024-07-27

## 2024-07-27 RX ORDER — CLONAZEPAM 2 MG/1
1 TABLET ORAL ONCE
Refills: 0 | Status: DISCONTINUED | OUTPATIENT
Start: 2024-07-27 | End: 2024-07-27

## 2024-07-27 RX ADMIN — CLONAZEPAM 1 MILLIGRAM(S): 2 TABLET ORAL at 22:44

## 2024-07-27 RX ADMIN — Medication 975 MILLIGRAM(S): at 18:24

## 2024-07-27 RX ADMIN — CLONAZEPAM 1 MILLIGRAM(S): 2 TABLET ORAL at 18:24

## 2024-07-27 RX ADMIN — Medication 500 MILLIGRAM(S): at 18:24

## 2024-07-27 NOTE — ED PROVIDER NOTE - NSFOLLOWUPINSTRUCTIONS_ED_ALL_ED_FT
You were seen in the ED for weakness and increased irritability.    Please continue taking the clonazepam as directed (0.5mg tablet in the morning, two 0.5mg tablets at night) - it may also help with your symptoms.    PLEASE FOLLOW-UP with your pain management doctor. A referral is given to you for a psychiatrist - a team member will also call you to help you set up an appointment.  You can also visit for psychiatric care:  1. [facility]  [address]th [place] [address] [phone]    2. [facility]  [address] [place] [address] [phone]    For pain: Take Tylenol 650mg (Two regular strength 325 mg pills) every 4-6 hours or two extra strength 500mg tablets every 8 hours as needed for pain or fevers. Do not exceed 1000mg at one time or 4000mg in a 24 hour period. You can also take Naproxen 500mg tablet  twice a day - do not take for more than five days. Take with food as it can cause stomach upset.     Return to the ED for any new or worsening symptoms such as severe chest pain, shortness of breath, severe abdominal pain, nausea or vomiting, seizures, increased falls, worsening weakness, thoughts of hurting yourself.

## 2024-07-27 NOTE — ED PROVIDER NOTE - PATIENT PORTAL LINK FT
You can access the FollowMyHealth Patient Portal offered by [facility] by registering at the following website: [email or web address]. By joining [facility]’s FollowMyHealth portal, you will also be able to view your health information using other applications (apps) compatible with our system.

## 2024-07-27 NOTE — ED PROVIDER NOTE - PROGRESS NOTE DETAILS
Labs within normal limits and nonactionable. Trop negative. Pt states that clonazepam helped her to sleep for about an hour before she woke up. currently pt reports continued generalized malaise, anxiety and irritability. Pt noted to be restless as well in bed, moving her legs. Pt reports that pain has improved at this time. BP slightly improved to 166/88. Attempted to reach pain management doctor, Dr. Zacarias Haines x 2, no answer.

## 2024-07-27 NOTE — ED PROVIDER NOTE - OBJECTIVE STATEMENT
83 y/o F with pmhx of HTN, HLD, DM, bipolar disorder (on clonazepam and escitalopram), sciatica who presents to the ED c/o generalized weakness, increased anxiety and depression over the last few months, but worse over the last two weeks. Pt reports decreased PO intake, insomnia, anhedonia that has been present intermittently over the last years but worse over the last two weeks. Pt states that she prescribed percocet 10/325mg tablets about two years ago for her sciatica pain by her pain management doctor, Dr. Haines. Pt has been taking these pills for the last two years with about 1-2 tablets every day. However, pt was told that taking her clonazepam with the percocet was dangerous and so pt stopped taking her clonazepam with only intermittent doses of the clonazepam every few weeks over the last two years. Pt then had left knee replacement at [facility] with Dr. Ahn on 5/16/24 and states she was taking more of the percocet with 2-3 tablets everyday for pain management. Pt never tried to wean off the percocet for pain in the knee but has not taken her clonzapam during the last two months. Over the last two months, pt reports the sx of decreased PO intake, insomnia with about three hours of sleep a night, anhedonia (stating she doesn't want to talk to her friends or family or go anywhere). Pt was urged by her partner to take her clonazepam which she did today with improvement in her symptoms, however she did not take percocet since her last dose last night. Pt has not tried anything else for her pain. Pt wishes to stop using opioids for pain relief. Denies SI/HI, CP, SOB, fever/chills, HA, N/V, abd pain, diarrhea/constipation, dysuria, back pain, extremity paresthesias/weakness. NKDA.

## 2024-07-27 NOTE — ED PROVIDER NOTE - ATTENDING APP SHARED VISIT CONTRIBUTION OF CARE
84 yo F with past medical history of HTN, HLD, DM II, migraines, anxiety, arthritis, glaucoma 82 yo F with past medical history of HTN, HLD, DM II, migraines, anxiety, arthritis, glaucoma, Who presents with likely mild opiate dependence withdrawal.  Patient has been taking 1-2 oxycodones daily for the last 2 years secondary to multiple past orthopedic surgery as well as sciatica.  She is also bipolar and takes benzos which she has not taken in some time.  She is otherwise compliant with her other medications but not with Lexapro.  Patient has an established psychiatrist who she has reached out to and also has pain management.  Patient on physical exam does not appear to be in any distress mildly uncomfortable from her withdrawals but has a COWS score of 6.  She is amenable to small dose benzo and her psychiatrist has also called her in some benzo for Monday.  Her dose will change from 0.5 mg to 1 mg.  Patient is accompanied by a friend.  Will take her home and she was given strict return instructions all questions were answered all results were reviewed.  Patient was given a copy of all results and discharge paperwork.

## 2024-07-27 NOTE — ED ADULT NURSE NOTE - OBJECTIVE STATEMENT
patient aox3, breathing even and unlabored on RA c.o feeling weak after stopping her percocet for knee surgery.

## 2024-07-27 NOTE — ED ADULT TRIAGE NOTE - CHIEF COMPLAINT QUOTE
BIBEMS from home. Pt states "I'm not feeling well today." Pt states weakness with nausea. Pt states she stopped taking percocets last night, has been taking 2-3 pills per day since knee surgery in may.

## 2024-07-27 NOTE — ED PROVIDER NOTE - PHYSICAL EXAMINATION
General: Well appearing in no acute distress, alert and cooperative  Head: Normocephalic, atraumatic  Eyes: PERRLA, no conjunctival injection, no scleral icterus  ENMT: oist mucous membranes, oropharynx clear  Neck: Soft and supple, full ROM without pain, no midline tenderness  Cardiac: Regular rate and regular rhythm, no murmurs  Resp: Unlabored respiratory effort, lungs CTAB, speaking in full sentences, no wheezes  Abd: Soft, non-tender, non-distended, no guarding or rebound tenderness. No CVA tenderness.   MSK: Spine midline and non-tender. No tenderness to palpation over left knee with well healed incision with no purulent drainage, erythema, warmth or swelling.   Skin: Warm and dry, no rashes/abrasions/lacerations  Neuro: AO x 3, moves all extremities symmetrically, Motor strength 5/5 bilaterally UE and LE, sensation grossly intact. Normal gait.

## 2024-07-27 NOTE — ED PROVIDER NOTE - CLINICAL SUMMARY MEDICAL DECISION MAKING FREE TEXT BOX
85 y/o F with pmhx of HTN, bipolar disorder (on clonazepam and escitalopram), sciatica who presents to the ED c/o generalized weakness, increased anxiety and depression over the last few months, but worse over the last two weeks. Pt reports decreased PO intake, insomnia, anhedonia that has been present intermittently over the last years but worse over the last two weeks. Pt states that she prescribed percocet 10/325mg tablets about two years ago for her sciatica pain by her pain management doctor, Dr. Haines. Pt has been taking these pills for the last two years with about 1-2 tablets every day. However, pt was told that taking her clonazepam with the percocet was dangerous and so pt stopped taking her clonazepam with only intermittent doses of the clonazepam every few weeks over the last two years. Pt then had left knee replacement at \[facility]"" with Dr. Ahn on 5/16/24 and states she was taking more of the percocet with 2-3 tablets everyday for pain management. Pt never tried to wean off the percocet for pain in the knee but has not taken her clonzapam during the last two months. Over the last two months, pt reports the sx of decreased PO intake, insomnia with about three hours of sleep a night, anhedonia (stating she doesn't want to talk to her friends or family or go anywhere). Pt was urged by her partner to take her clonazepam which she did today with improvement in her symptoms, however she did not take percocet since her last dose last night. Pt has not tried anything else for her pain. Pt wishes to stop using opioids for pain relief. Denies SI/HI, CP, SOB, fever/chills, HA, N/V, abd pain, diarrhea/constipation, dysuria, back pain, extremity paresthesias/weakness. NKDA.   Patient currently afebrile, hemodynamically stable, spO2 100%. Based on history and physical, differentials include but are not limited to electrolyte derangement vs ACS vs opioid withdrawal vs anemia. Plan to assess patient for acute pathology as listed above with labs, CXR. EKG NSR at 72 bpm with no ischemic changes. Will administer medications for symptomatic relief, follow-up on results, and reassess. 85 y/o F with pmhx of HTN, bipolar disorder (on clonazepam and escitalopram), sciatica who presents to the ED c/o generalized weakness, increased anxiety and depression over the last few months, but worse over the last two weeks. Pt reports decreased PO intake, insomnia, anhedonia that has been present intermittently over the last years but worse over the last two weeks.    Patient currently afebrile, hemodynamically stable with BP noted to be elevated to 180/89, spO2 100%. Based on history and physical, differentials include but are not limited to electrolyte derangement vs ACS vs opioid withdrawal vs anemia. Plan to assess patient for acute pathology as listed above with labs, CXR. EKG NSR at 72 bpm with no ischemic changes. Will administer medications for symptomatic relief, follow-up on results, and reassess. Will repeat BP once pt sx are treated.

## 2024-10-29 ENCOUNTER — APPOINTMENT (OUTPATIENT)
Dept: OPHTHALMOLOGY | Facility: CLINIC | Age: 84
End: 2024-10-29

## 2024-12-25 PROBLEM — F10.90 ALCOHOL USE: Status: ACTIVE | Noted: 2017-12-19

## 2025-04-03 ENCOUNTER — NON-APPOINTMENT (OUTPATIENT)
Age: 85
End: 2025-04-03

## 2025-04-03 ENCOUNTER — APPOINTMENT (OUTPATIENT)
Dept: VASCULAR SURGERY | Facility: CLINIC | Age: 85
End: 2025-04-03

## 2025-05-05 ENCOUNTER — APPOINTMENT (OUTPATIENT)
Dept: VASCULAR SURGERY | Facility: CLINIC | Age: 85
End: 2025-05-05

## 2025-05-08 ENCOUNTER — APPOINTMENT (OUTPATIENT)
Dept: ULTRASOUND IMAGING | Facility: CLINIC | Age: 85
End: 2025-05-08
Payer: MEDICARE

## 2025-05-08 ENCOUNTER — OUTPATIENT (OUTPATIENT)
Dept: OUTPATIENT SERVICES | Facility: HOSPITAL | Age: 85
LOS: 1 days | End: 2025-05-08

## 2025-05-08 DIAGNOSIS — Z98.890 OTHER SPECIFIED POSTPROCEDURAL STATES: Chronic | ICD-10-CM

## 2025-05-08 PROCEDURE — 76770 US EXAM ABDO BACK WALL COMP: CPT | Mod: 26

## 2025-05-22 ENCOUNTER — NON-APPOINTMENT (OUTPATIENT)
Age: 85
End: 2025-05-22

## 2025-05-22 ENCOUNTER — APPOINTMENT (OUTPATIENT)
Dept: UROLOGY | Facility: CLINIC | Age: 85
End: 2025-05-22
Payer: MEDICARE

## 2025-05-22 VITALS
RESPIRATION RATE: 20 BRPM | SYSTOLIC BLOOD PRESSURE: 167 MMHG | HEART RATE: 64 BPM | HEIGHT: 61 IN | DIASTOLIC BLOOD PRESSURE: 77 MMHG | OXYGEN SATURATION: 94 % | TEMPERATURE: 98 F

## 2025-05-22 DIAGNOSIS — N28.1 CYST OF KIDNEY, ACQUIRED: ICD-10-CM

## 2025-05-22 PROCEDURE — 99204 OFFICE O/P NEW MOD 45 MIN: CPT

## 2025-05-23 LAB
APPEARANCE: ABNORMAL
BACTERIA: ABNORMAL /HPF
BILIRUBIN URINE: ABNORMAL
BLOOD URINE: NEGATIVE
CAST: 1 /LPF
COLOR: NORMAL
EPITHELIAL CELLS: 7 /HPF
GLUCOSE QUALITATIVE U: NEGATIVE MG/DL
HYALINE CASTS: PRESENT
KETONES URINE: ABNORMAL MG/DL
LEUKOCYTE ESTERASE URINE: ABNORMAL
MICROSCOPIC-UA: NORMAL
MUCUS: PRESENT
NITRITE URINE: NEGATIVE
PH URINE: 5
PROTEIN URINE: NORMAL MG/DL
RED BLOOD CELLS URINE: NORMAL /HPF
REVIEW: NORMAL
SPECIFIC GRAVITY URINE: 1.03
UROBILINOGEN URINE: 1 MG/DL
WBC CLUMPS: PRESENT
WHITE BLOOD CELLS URINE: 147 /HPF

## 2025-05-26 LAB — BACTERIA UR CULT: ABNORMAL

## 2025-06-05 LAB
APPEARANCE: ABNORMAL
BACTERIA: ABNORMAL /HPF
BILIRUBIN URINE: ABNORMAL
BLOOD URINE: NEGATIVE
CAST: 4 /LPF
COARSE GRANULAR CASTS: PRESENT
COLOR: NORMAL
EPITHELIAL CELLS: 9 /HPF
GLUCOSE QUALITATIVE U: NEGATIVE MG/DL
HYALINE CASTS: PRESENT
KETONES URINE: ABNORMAL MG/DL
LEUKOCYTE ESTERASE URINE: ABNORMAL
MICROSCOPIC-UA: NORMAL
MUCUS: PRESENT
NITRITE URINE: POSITIVE
PH URINE: 5
PROTEIN URINE: NORMAL MG/DL
RED BLOOD CELLS URINE: NORMAL /HPF
REVIEW: NORMAL
SPECIFIC GRAVITY URINE: 1.02
URINE COMMENTS: NORMAL
UROBILINOGEN URINE: 1 MG/DL
WBC CLUMPS: PRESENT
WHITE BLOOD CELLS URINE: 69 /HPF

## 2025-07-18 LAB
APPEARANCE: ABNORMAL
BACTERIA: ABNORMAL /HPF
BILIRUBIN URINE: ABNORMAL
BLOOD URINE: NEGATIVE
CAST: 5 /LPF
COLOR: NORMAL
EPITHELIAL CELLS: 12 /HPF
GLUCOSE QUALITATIVE U: NEGATIVE MG/DL
HYALINE CASTS: PRESENT
KETONES URINE: ABNORMAL MG/DL
LEUKOCYTE ESTERASE URINE: ABNORMAL
MICROSCOPIC-UA: NORMAL
NITRITE URINE: POSITIVE
PH URINE: 5
PROTEIN URINE: NORMAL MG/DL
RED BLOOD CELLS URINE: 1 /HPF
REVIEW: NORMAL
SPECIFIC GRAVITY URINE: 1.02
UROBILINOGEN URINE: 1 MG/DL
WBC CLUMPS: PRESENT
WHITE BLOOD CELLS URINE: 54 /HPF

## 2025-07-21 DIAGNOSIS — R35.0 FREQUENCY OF MICTURITION: ICD-10-CM

## 2025-07-21 LAB — BACTERIA UR CULT: ABNORMAL

## 2025-07-21 RX ORDER — NITROFURANTOIN (MONOHYDRATE/MACROCRYSTALS) 25; 75 MG/1; MG/1
100 CAPSULE ORAL
Qty: 10 | Refills: 0 | Status: ACTIVE | COMMUNITY
Start: 2025-07-21 | End: 1900-01-01

## 2025-07-28 NOTE — ED PROVIDER NOTE - ST/T WAVE
"Columba Fitzgerald is a 9 y.o. female who presents for Earache (RT ear, jaw pain with mom ).  HPI  Here with and History provided by mom    On vacation last week had some mild ear pain  But getting worse    Hurting a lot  No fever  No uri  No rashes        Objective   Temp 37.2 °C (99 °F) (Oral)   Ht 1.346 m (4' 5\")   Wt 34 kg   BMI 18.77 kg/m²     Physical Exam    Otitis externa (outer ear infection)  (Swimmers Ear):  We will treat with antibiotic ear drops for 5 days and comfort measures such as ibuprofen and acetaminophen.    Do not submerge head under water until significant improvement in symptoms  Call for if no improvement in 2-3 days or for any new concerns            No results found for this or any previous visit (from the past 96 hours).          Assessment/Plan   Diagnoses and all orders for this visit:  Acute swimmer's ear of right side  -     ciprofloxacin-dexamethasone (Ciprodex) otic suspension; Administer 4 drops into each ear 2 times a day for 5 days.      Patient Instructions   Otitis externa (outer ear infection)  ([facility]):  We will treat with antibiotic ear drops for 5 days and comfort measures such as ibuprofen and acetaminophen.    Do not submerge head under water until significant improvement in symptoms  Call for if no improvement in 2-3 days or for any new concerns                                 Concepción Rivera MD   "
No ST elevation or depression.